# Patient Record
Sex: FEMALE | Race: OTHER | Employment: UNEMPLOYED | URBAN - METROPOLITAN AREA
[De-identification: names, ages, dates, MRNs, and addresses within clinical notes are randomized per-mention and may not be internally consistent; named-entity substitution may affect disease eponyms.]

---

## 2018-01-09 PROBLEM — K76.82 HEPATIC ENCEPHALOPATHY: Status: ACTIVE | Noted: 2018-01-09

## 2018-01-09 PROBLEM — G89.29 CHRONIC ABDOMINAL PAIN: Status: ACTIVE | Noted: 2018-01-09

## 2018-01-09 PROBLEM — R10.9 CHRONIC ABDOMINAL PAIN: Status: ACTIVE | Noted: 2018-01-09

## 2018-09-11 ENCOUNTER — OFFICE VISIT (OUTPATIENT)
Dept: FAMILY MEDICINE CLINIC | Age: 58
End: 2018-09-11

## 2018-09-11 VITALS
SYSTOLIC BLOOD PRESSURE: 114 MMHG | RESPIRATION RATE: 17 BRPM | OXYGEN SATURATION: 98 % | HEIGHT: 60 IN | TEMPERATURE: 98.6 F | HEART RATE: 104 BPM | BODY MASS INDEX: 27.88 KG/M2 | DIASTOLIC BLOOD PRESSURE: 64 MMHG | WEIGHT: 142 LBS

## 2018-09-11 DIAGNOSIS — G89.4 CHRONIC PAIN SYNDROME: ICD-10-CM

## 2018-09-11 DIAGNOSIS — K74.60 CIRRHOSIS OF LIVER WITH ASCITES, UNSPECIFIED HEPATIC CIRRHOSIS TYPE (HCC): Primary | ICD-10-CM

## 2018-09-11 DIAGNOSIS — R18.8 CIRRHOSIS OF LIVER WITH ASCITES, UNSPECIFIED HEPATIC CIRRHOSIS TYPE (HCC): Primary | ICD-10-CM

## 2018-09-11 DIAGNOSIS — K74.5 BILIARY CIRRHOSIS (HCC): ICD-10-CM

## 2018-09-11 RX ORDER — SPIRONOLACTONE 50 MG/1
100 TABLET, FILM COATED ORAL DAILY
Status: ON HOLD | COMMUNITY
End: 2019-04-09 | Stop reason: SDUPTHER

## 2018-09-11 RX ORDER — HYDROCODONE BITARTRATE AND ACETAMINOPHEN 10; 325 MG/1; MG/1
1 TABLET ORAL 2 TIMES DAILY
Qty: 30 TAB | Refills: 0 | Status: SHIPPED | OUTPATIENT
Start: 2018-09-11 | End: 2018-09-26

## 2018-09-11 RX ORDER — FUROSEMIDE 20 MG/1
20 TABLET ORAL DAILY
Status: ON HOLD | COMMUNITY
End: 2019-04-08 | Stop reason: SDUPTHER

## 2018-09-11 NOTE — MR AVS SNAPSHOT
[address] [address] 
[phone] Patient: Gricelda Kirkland MRN: [id number] TWE:[id number] Visit Information Date & Time Provider Department Dept. Phone Encounter #  
 9/11/2018  2:30 PM Sean Gomez MD [facility] [phone] [id number] Upcoming Health Maintenance Date Due Hepatitis C Screening 1960 Pneumococcal 19-64 Medium Risk (1 of 1 - PPSV23) 7/4/1981 DTaP/Tdap/Td series (1 - Tdap) 7/4/1983 PAP AKA CERVICAL CYTOLOGY 7/4/1983 BREAST CANCER SCRN MAMMOGRAM 7/4/2012 FOBT Q 1 YEAR AGE 50-75 7/4/2012 Influenza Age 5 to Adult 8/1/2018 Allergies as of 9/11/2018  Review Complete On: 8/5/2018 By: Nadia Wells RN Severity Noted Reaction Type Reactions Hydromorphone High 02/07/2017    Anaphylaxis Ativan [Lorazepam]  02/07/2017    Other (comments) Betamethasone  02/07/2017    Other (comments) Chlorzoxazone  02/07/2017    Other (comments) Codeine  02/07/2017    Other (comments) Hallucinations Diphenhydramine  02/07/2017    Hives, Rash Erythromycin  02/07/2017    Other (comments) Ibuprofen  02/07/2017    Other (comments) Gi bleeding Iodinated Contrast- Oral And Iv Dye  02/07/2017    Shortness of Breath Ketorolac Tromethamine  02/07/2017    Rash, Swelling Nubain [Nalbuphine]  02/07/2017    Other (comments) Sumatriptan  02/07/2017    Nausea and Vomiting Tramadol  02/07/2017    Other (comments) Tremors; skin irritation Current Immunizations  Never Reviewed No immunizations on file. Not reviewed this visit You Were Diagnosed With   
  
 Codes Comments Cirrhosis of liver with ascites, unspecified hepatic cirrhosis type ([facility] [address]    -  Primary ICD-10-CM: K74.60 ICD-9-CM: 571.5 Biliary cirrhosis (HCC)     ICD-10-CM: L18.1 ICD-9-CM: 571.6 Chronic pain syndrome     ICD-10-CM: G89.4 ICD-9-CM: 338. 4 Vitals BP Pulse Temp Resp Height(growth percentile) Weight(growth percentile) 114/64 (!) 104 98.6 °F (37 °C) (Oral) 17 5' (1.524 m) 142 lb (64.4 kg) SpO2 BMI OB Status Smoking Status 98% 27.73 kg/m2 Hysterectomy Former Smoker Vitals History BMI and BSA Data Body Mass Index Body Surface Area  
 27.73 kg/m 2 1.65 m 2 Preferred Pharmacy Pharmacy Name Phone [facility], [address] [place] [address] [phone] Your Updated Medication List  
  
   
This list is accurate as of 9/11/18  3:32 PM.  Always use your most recent med list.  
  
  
  
  
 HYDROcodone-acetaminophen  mg tablet Commonly known as:  Tiffanie Serrato Take 1 Tab by mouth two (2) times a day. Max Daily Amount: 2 Tabs. As needed  
  
 lactulose 10 gram/15 mL solution Commonly known as:  [place] Take 30 g by mouth three (3) times daily. LASIX 20 mg tablet Generic drug:  furosemide Take  by mouth daily. methocarbamol 500 mg tablet Commonly known as:  ROBAXIN Take 1 Tab by mouth four (4) times daily. omeprazole 20 mg capsule Commonly known as:  PRILOSEC Take 20 mg by mouth daily. ondansetron hcl 4 mg tablet Commonly known as:  [place]urlean Precise Take 4 mg by mouth every six (6) hours as needed for Nausea. potassium chloride SR 20 mEq tablet Commonly known as:  K-TAB Take 1 Tab by mouth two (2) times a day. promethazine 25 mg tablet Commonly known as:  PHENERGAN Take 1 Tab by mouth every six (6) hours as needed. spironolactone 50 mg tablet Commonly known as:  ALDACTONE Take  by mouth daily. XIFAXAN 550 mg tablet Generic drug:  rifAXIMin Take 550 mg by mouth two (2) times a day. Prescriptions Printed Refills  HYDROcodone-acetaminophen (NORCO)  mg tablet 0  
 Sig: Take 1 Tab by mouth two (2) times a day. Max Daily Amount: 2 Tabs. As needed Class: Print Route: Oral  
  
We Performed the Following REFERRAL TO PAIN MANAGEMENT [[id number] Custom] Comments:  
 Chronic pain with biliary cirrhosis ,for evaluation and management Referral Information Referral ID Referred By Referred To  
  
 [id number] Keysha COHN Not Available Visits Status Start Date End Date 1 New Request 9/11/18 9/11/19 If your referral has a status of pending review or denied, additional information will be sent to support the outcome of this decision. Introducing [facility]! Abbi Edwards introduces [employer] patient portal. Now you can access parts of your medical record, email your doctor's office, and request medication refills online. 1. In your internet browser, go to [email or web address]. [email or web address] 2. Click on the First Time User? Click Here link in the Sign In box. You will see the New Member Sign Up page. 3. Enter your [employer] Access Code exactly as it appears below. You will not need to use this code after youve completed the sign-up process. If you do not sign up before the expiration date, you must request a new code. · [employer] Access Code: [email or web address] Expires: 12/10/2018  3:05 PM 
 
4. Enter the last four digits of your Social Security Number (xxxx) and Date of Birth (mm/dd/yyyy) as indicated and click Submit. You will be taken to the next sign-up page. 5. Create a [employer] ID. This will be your [employer] login ID and cannot be changed, so think of one that is secure and easy to remember. 6. Create a [employer] password. You can change your password at any time. 7. Enter your Password Reset Question and Answer. This can be used at a later time if you forget your password. 8. Enter your e-mail address. You will receive e-mail notification when new information is available in [address] 9. Click Sign Up. You can now view and download portions of your medical record. 10. Click the Download Summary menu link to download a portable copy of your medical information. If you have questions, please visit the Frequently Asked Questions section of the [employer] website. Remember, [employer] is NOT to be used for urgent needs. For medical emergencies, dial 911. Now available from your iPhone and Android! Please provide this summary of care documentation to your next provider. Your primary care clinician is listed as Annalise Luna. If you have any questions after today's visit, please call [phone].

## 2018-09-11 NOTE — PROGRESS NOTES
Andrew Frances is a 66 y.o. female presents to office to establish care. Right upper quad pain Medication list has been reviewed with Andrew Frances and updated as of today's date Patient has been asked if refills needed as of today's date in which they replied;  no 
 
Hearing/Vision: No exam data present Learning Assessment:  
 
Learning Assessment 9/11/2018 PRIMARY LEARNER Patient PRIMARY LANGUAGE ENGLISH  
LEARNER PREFERENCE PRIMARY PICTURES  
  LISTENING  
  DEMONSTRATION  
  VIDEOS  
ANSWERED BY patient RELATIONSHIP SELF Depression Screening: No flowsheet data found. Fall Risk Assessment:  
 
Fall Risk Assessment, last 12 mths 9/11/2018 Able to walk? Yes Fall in past 12 months? No  
 
 
Abuse Screening:  
 
Abuse Screening Questionnaire 9/11/2018 Do you ever feel afraid of your partner? Frank Cooper Are you in a relationship with someone who physically or mentally threatens you? Frank Cooper Is it safe for you to go home?  Navi Rodney

## 2018-09-12 NOTE — PROGRESS NOTES
[facility] Chief Complaint Patient presents with Mel Carcamo Establish Care Vitals:  
 09/11/18 1441 BP: 114/64 Pulse: (!) 104 Resp: 17 Temp: 98.6 °F (37 °C) TempSrc: Oral  
SpO2: 98% Weight: 142 lb (64.4 kg) Height: 5' (1.524 m) PainSc:   9  
 
 
 
HPI: 66years old female accompanied by her daughter today, she is here to establish her care, and looking into changing her PCP they were not satisfied with her previous doctor! Patient has been diagnosed 2 years ago was biliary cirrhosis, and she had liver cirrhosis and liver failure, she is following up with the [facility] and she is in the process of being evaluated for liver transplant. Patient had fatigue she is tired and nauseous, and she is having right upper quadrant pain, she was on morphine sulfate from her previous provider and now she had tapered off and she is not on morphine anymore, she is looking into getting to pain management so she can be in a long-term pain control medication, she meanwhile she has been taking Norco as needed for pain. She needs refill for her [place] for today and a referral for pain management. Past Medical History:  
Diagnosis Date  Arthropathy, unspecified, site unspecified  Cirrhosis ([address]  Esophageal reflux  Hemochromatosis  Hypertension  Liver failure ([address]  Lower back pain  Migraine  Pancreatitis  Sinusitis  Uterine cancer ([address] Past Surgical History:  
Procedure Laterality Date  HX HERNIA REPAIR    
 HX LAP CHOLECYSTECTOMY    
 3/2007  HX TOTAL VAGINAL HYSTERECTOMY Social History Substance Use Topics  Smoking status: Former Smoker Packs/day: 0.00 Quit date: 11/11/2017  Smokeless tobacco: Never Used  Alcohol use No  
 
 
Family History Problem Relation Age of Onset  Hypertension Father Review of Systems Constitutional: Positive for malaise/fatigue.  Negative for chills, fever and weight loss. HENT: Negative for congestion, ear discharge, ear pain, hearing loss, nosebleeds, sinus pain and sore throat. Eyes: Negative for blurred vision, double vision and discharge. Respiratory: Negative for cough, hemoptysis, sputum production, shortness of breath and wheezing. Cardiovascular: Positive for palpitations. Negative for chest pain, claudication and leg swelling. Gastrointestinal: Positive for abdominal pain and nausea. Negative for blood in stool, constipation, diarrhea, melena and vomiting. Genitourinary: Negative for dysuria, frequency and urgency. Musculoskeletal: Negative for back pain, joint pain, myalgias and neck pain. Skin: Negative for itching and rash. Neurological: Positive for dizziness. Negative for tingling, sensory change, speech change, focal weakness, weakness and headaches. Physical Exam  
Constitutional: She is oriented to person, place, and time. She appears well-developed and well-nourished. No distress. HENT:  
Head: Normocephalic and atraumatic. Mouth/Throat: Oropharynx is clear and moist. No oropharyngeal exudate. Eyes: Pupils are equal, round, and reactive to light. Right eye exhibits no discharge. Left eye exhibits no discharge. No scleral icterus. Patient has jaundice Neck: Normal range of motion. Neck supple. No thyromegaly present. Cardiovascular: Normal rate, regular rhythm and normal heart sounds. No murmur heard. Pulmonary/Chest: Effort normal and breath sounds normal. No respiratory distress. She has no wheezes. She has no rales. She exhibits no tenderness. Abdominal: Soft. Bowel sounds are normal. She exhibits distension. There is no tenderness. There is no rebound and no guarding. Patient has abdominal distention shifting dullness is positive for ascites Musculoskeletal: She exhibits no edema or deformity. Lymphadenopathy:  
  She has no cervical adenopathy. Neurological: She is alert and oriented to person, place, and time. No cranial nerve deficit. Skin: Skin is warm and dry. No rash noted. She is not diaphoretic. No erythema. No pallor. Yellow discoloration of the skin Psychiatric: She has a normal mood and affect. Her behavior is normal. Judgment and thought content normal.  
  
 
Assessment and plan 1. Cirrhosis of liver with ascites, unspecified hepatic cirrhosis type ([facility] Patient is in the process of getting evaluated for liver transplant 
- REFERRAL TO PAIN MANAGEMENT 
- HYDROcodone-acetaminophen (NORCO)  mg tablet; Take 1 Tab by mouth two (2) times a day. Max Daily Amount: 2 Tabs. As needed  Dispense: 30 Tab; Refill: 0 
 
2. Biliary cirrhosis (HCC) 
 
- REFERRAL TO PAIN MANAGEMENT 
- HYDROcodone-acetaminophen (NORCO)  mg tablet; Take 1 Tab by mouth two (2) times a day. Max Daily Amount: 2 Tabs. As needed  Dispense: 30 Tab; Refill: 0 
 
3. Chronic pain syndrome I have discussed with the patient and daughter at length that chronic pain syndrome can lead to narcotic abuse she have to take the medication only as needed, and there is a high risk for drug overdose the daughter have access to Narcan. They both agreed to the plan and they have verbalized understanding 
- REFERRAL TO PAIN MANAGEMENT 
- HYDROcodone-acetaminophen (NORCO)  mg tablet; Take 1 Tab by mouth two (2) times a day. Max Daily Amount: 2 Tabs. As needed  Dispense: 30 Tab; Refill: 0 Current Outpatient Prescriptions Medication Sig Dispense Refill  furosemide (LASIX) 20 mg tablet Take  by mouth daily.  spironolactone (ALDACTONE) 50 mg tablet Take  by mouth daily.  HYDROcodone-acetaminophen (NORCO)  mg tablet Take 1 Tab by mouth two (2) times a day. Max Daily Amount: 2 Tabs. As needed 30 Tab 0  
 methocarbamol (ROBAXIN) 500 mg tablet Take 1 Tab by mouth four (4) times daily.  12 Tab 0  
  promethazine (PHENERGAN) 25 mg tablet Take 1 Tab by mouth every six (6) hours as needed. 12 Tab 0  
 potassium chloride SR (K-TAB) 20 mEq tablet Take 1 Tab by mouth two (2) times a day. 14 Tab 0  
 lactulose (CHRONULAC) 10 gram/15 mL solution Take 30 g by mouth three (3) times daily.  omeprazole (PRILOSEC) 20 mg capsule Take 20 mg by mouth daily.  ondansetron hcl (ZOFRAN) 4 mg tablet Take 4 mg by mouth every six (6) hours as needed for Nausea.  rifAXIMin (XIFAXAN) 550 mg tablet Take 550 mg by mouth two (2) times a day. Patient Active Problem List  
 Diagnosis Date Noted  Biliary cirrhosis ([facility] 09/11/2018  Cirrhosis of liver with ascites ([facility] 09/11/2018  Chronic pain syndrome 09/11/2018  Hepatic encephalopathy ([facility] 01/09/2018  Chronic abdominal pain 01/09/2018 Results for orders placed or performed during the hospital encounter of 07/16/18 LACTIC ACID Result Value Ref Range Lactic Acid 2.4 (H) 0.4 - 2.0 mmol/L Admission on 07/16/2018, Discharged on 07/16/2018 Component Date Value Ref Range Status  Lactic Acid 07/16/2018 2.4* 0.4 - 2.0 mmol/L Final  
Admission on 07/15/2018, Discharged on 07/15/2018 Component Date Value Ref Range Status  WBC 07/15/2018 2.6* 4.0 - 11.0 1000/mm3 Final  
 RBC 07/15/2018 3.07* 3.60 - 5.20 M/uL Final  
 HGB 07/15/2018 9.4* 13.0 - 17.2 gm/dl Final  
 HCT 07/15/2018 27.7* 37.0 - 50.0 % Final  
 MCV 07/15/2018 90.2  80.0 - 98.0 fL Final  
 MCH 07/15/2018 30.6  25.4 - 34.6 pg Final  
 MCHC 07/15/2018 33.9  30.0 - 36.0 gm/dl Final  
 PLATELET 39/78/3078 70* 140 - 450 1000/mm3 Final  
 RDW-SD 07/15/2018 65.5* 36.4 - 46.3   Final  
 NEUTROPHILS 07/15/2018 67.2* 34 - 64 % Final  
 LYMPHOCYTES 07/15/2018 19.5* 28 - 48 % Final  
 MONOCYTES 07/15/2018 9.4  1 - 13 % Final  
 EOSINOPHILS 07/15/2018 3.1  0 - 5 % Final  
 BASOPHILS 07/15/2018 0.4  0 - 3 % Final  
 Sodium 07/15/2018 140  136 - 145 mEq/L Final  
  Potassium 07/15/2018 4.0  3.5 - 5.1 mEq/L Final  
 Chloride 07/15/2018 106  98 - 107 mEq/L Final  
 CO2 07/15/2018 27  21 - 32 mEq/L Final  
 Glucose 07/15/2018 115* 74 - 106 mg/dl Final  
 BUN 07/15/2018 10  7 - 25 mg/dl Final  
 Creatinine 07/15/2018 0.8  0.6 - 1.3 mg/dl Final  
 GFR est AA 07/15/2018 >60.0    Final  
 Comment: THE [employer] WORKING GROUP STATES THAT THE MDRD STUDY EQUATION SHOULD ONLY BE USED ON 
INDIVIDUALS 18 OR OLDER. THE REPORT ALSO NOTES THAT THE MDRD STUDY EQUATION HAS NOT BEEN 
VALIDATED FOR USE WITH THE ELDERLY (OVER 79YEARS OF AGE), PREGNANT WOMEN, PATIENTS WITH SERIOUS 
COMORBID CONDITIONS, OR PERSONS WITH EXTREMES OF BODY SIZE, MUSCLE MASS, OR NUTRITIONAL STATUS. APPLICATION OF THE EQUATION TO THESE PATIENT GROUPS MAY LEAD TO ERRORS IN GFR ESTIMATION. GFR 
ESTIMATING EQUATIONS HAVE POORER AGREEMENT WITH MEASURED GFR FOR ILL HOSPITALIZED PATIENTS AND FOR PEOPLE WITH NEAR NORMAL KIDNEY FUNCTION THAN FOR SUBJECTS IN THE MDRD STUDY. VALIDATION 
STUDIES ARE IN PROGRESS TO EVALUATE THE MDRD STUDY EQUATION FOR ADDITIONAL ETHNIC GROUPS, THE 
ELDERLY, VARIOUS DISEASE CONDITIONS, AND PEOPLE WITH NORMAL KIDNEY FUNCTION. GFRA----REFERS TO  
GFRO---REFERS TO OTHER RACES 
REFERENCES AVAILABLE UPON REQUEST. 
  
 GFR est non-AA 07/15/2018 >60    Final  
 Calcium 07/15/2018 8.2* 8.5 - 10.1 mg/dl Final  
 AST (SGOT) 07/15/2018 51* 15 - 37 U/L Final  
 ALT (SGPT) 07/15/2018 22  12 - 78 U/L Final  
 Alk.  phosphatase 07/15/2018 237* 45 - 117 U/L Final  
 Bilirubin, total 07/15/2018 4.0* 0.2 - 1.0 mg/dl Final  
 Protein, total 07/15/2018 6.2* 6.4 - 8.2 gm/dl Final  
 Albumin 07/15/2018 2.3* 3.4 - 5.0 gm/dl Final  
 Anion gap 07/15/2018 7  5 - 15 mmol/L Final  
 Lipase 07/15/2018 324  73 - 393 U/L Final  
 Magnesium 07/15/2018 1.7  1.6 - 2.6 mg/dl Final  
 Lactic Acid 07/15/2018 2.4* 0.4 - 2.0 mmol/L Final  
  Glucose 07/15/2018 Negative  NEGATIVE,Negative mg/dl Final  
 Bilirubin 07/15/2018 Negative  NEGATIVE,Negative   Final  
 Ketone 07/15/2018 Negative  NEGATIVE,Negative mg/dl Final  
 Specific gravity 07/15/2018 1.015  1.005 - 1.030   Final  
 Blood 07/15/2018 Small* NEGATIVE,Negative   Final  
 pH (UA) 07/15/2018 6.5  5 - 9   Final  
 Protein 07/15/2018 Negative  NEGATIVE,Negative mg/dl Final  
 Urobilinogen 07/15/2018 0.2  0.0 - 1.0 EU/dl Final  
 Nitrites 07/15/2018 Negative  NEGATIVE,Negative   Final  
 Leukocyte Esterase 07/15/2018 Negative  NEGATIVE,Negative   Final  
 Color 07/15/2018 Other    Final  
 Appearance 07/15/2018 Other    Final  
 : [id number] Admission on 07/07/2018, Discharged on 07/08/2018 Component Date Value Ref Range Status  WBC 07/07/2018 3.1* 4.0 - 11.0 1000/mm3 Final  
 RBC 07/07/2018 3.18* 3.60 - 5.20 M/uL Final  
 HGB 07/07/2018 9.7* 13.0 - 17.2 gm/dl Final  
 HCT 07/07/2018 29.2* 37.0 - 50.0 % Final  
 MCV 07/07/2018 91.8  80.0 - 98.0 fL Final  
 MCH 07/07/2018 30.5  25.4 - 34.6 pg Final  
 MCHC 07/07/2018 33.2  30.0 - 36.0 gm/dl Final  
 PLATELET 04/10/1373 60* 140 - 450 1000/mm3 Final  
 MPV 07/07/2018 11.8* 6.0 - 10.0 fL Final  
 RDW-SD 07/07/2018 65.1* 36.4 - 46.3   Final  
 NRBC 07/07/2018 0  0 - 0   Final  
 IMMATURE GRANULOCYTES 07/07/2018 0.3  0.0 - 3.0 % Final  
 Comment: IG - Immature granulocytes (promyelocytes + myelocytes + metamyelocytes), their presence 
indicates a left shift. An IG > 3% may predict positive blood cultures with 98% specificity. 
(P<0.04) and 92% Positive Predictive Value (Dahlia-Aicha)1. Increased immature granulocytes 
assist with the detection of infection and/or inflammation and may be present at an early stage 
and are more sensitive and specific than band counts.  
  
 NEUTROPHILS 07/07/2018 65.0* 34 - 64 % Final  
 LYMPHOCYTES 07/07/2018 21.5* 28 - 48 % Final  
 MONOCYTES 07/07/2018 9.0  1 - 13 % Final  
  EOSINOPHILS 07/07/2018 3.2  0 - 5 % Final  
 BASOPHILS 07/07/2018 1.0  0 - 3 % Final  
 Sodium 07/07/2018 141  136 - 145 mEq/L Final  
 Potassium 07/07/2018 3.5  3.5 - 5.1 mEq/L Final  
 Chloride 07/07/2018 107  98 - 107 mEq/L Final  
 CO2 07/07/2018 24  21 - 32 mEq/L Final  
 Glucose 07/07/2018 119* 74 - 106 mg/dl Final  
 BUN 07/07/2018 12  7 - 25 mg/dl Final  
 Creatinine 07/07/2018 0.9  0.6 - 1.3 mg/dl Final  
 GFR est AA 07/07/2018 >60.0    Final  
 Comment: THE [employer] WORKING GROUP STATES THAT THE MDRD STUDY EQUATION SHOULD ONLY BE USED ON 
INDIVIDUALS 18 OR OLDER. THE REPORT ALSO NOTES THAT THE MDRD STUDY EQUATION HAS NOT BEEN 
VALIDATED FOR USE WITH THE ELDERLY (OVER 79YEARS OF AGE), PREGNANT WOMEN, PATIENTS WITH SERIOUS 
COMORBID CONDITIONS, OR PERSONS WITH EXTREMES OF BODY SIZE, MUSCLE MASS, OR NUTRITIONAL STATUS. APPLICATION OF THE EQUATION TO THESE PATIENT GROUPS MAY LEAD TO ERRORS IN GFR ESTIMATION. GFR 
ESTIMATING EQUATIONS HAVE POORER AGREEMENT WITH MEASURED GFR FOR ILL HOSPITALIZED PATIENTS AND FOR PEOPLE WITH NEAR NORMAL KIDNEY FUNCTION THAN FOR SUBJECTS IN THE MDRD STUDY. VALIDATION 
STUDIES ARE IN PROGRESS TO EVALUATE THE MDRD STUDY EQUATION FOR ADDITIONAL ETHNIC GROUPS, THE 
ELDERLY, VARIOUS DISEASE CONDITIONS, AND PEOPLE WITH NORMAL KIDNEY FUNCTION. GFRA----REFERS TO  
GFRO---REFERS TO OTHER RACES 
REFERENCES AVAILABLE UPON REQUEST. 
  
 GFR est non-AA 07/07/2018 >60    Final  
 Calcium 07/07/2018 8.4* 8.5 - 10.1 mg/dl Final  
 AST (SGOT) 07/07/2018 40* 15 - 37 U/L Final  
 ALT (SGPT) 07/07/2018 22  12 - 78 U/L Final  
 Alk.  phosphatase 07/07/2018 254* 45 - 117 U/L Final  
 Bilirubin, total 07/07/2018 2.8* 0.2 - 1.0 mg/dl Final  
 Protein, total 07/07/2018 6.6  6.4 - 8.2 gm/dl Final  
 Albumin 07/07/2018 2.5* 3.4 - 5.0 gm/dl Final  
 Anion gap 07/07/2018 10  5 - 15 mmol/L Final  
 Lipase 07/07/2018 713* 73 - 393 U/L Final  
 Admission on 07/03/2018, Discharged on 07/03/2018 Component Date Value Ref Range Status  WBC 07/03/2018 3.7* 4.0 - 11.0 1000/mm3 Final  
 RBC 07/03/2018 3.32* 3.60 - 5.20 M/uL Final  
 HGB 07/03/2018 10.3* 13.0 - 17.2 gm/dl Final  
 HCT 07/03/2018 29.8* 37.0 - 50.0 % Final  
 MCV 07/03/2018 89.8  80.0 - 98.0 fL Final  
 MCH 07/03/2018 31.0  25.4 - 34.6 pg Final  
 MCHC 07/03/2018 34.6  30.0 - 36.0 gm/dl Final  
 PLATELET 96/39/5942 56* 140 - 450 1000/mm3 Final  
 MPV 07/03/2018 12.2* 6.0 - 10.0 fL Final  
 RDW-SD 07/03/2018 62.5* 36.4 - 46.3   Final  
 NRBC 07/03/2018 0  0 - 0   Final  
 IMMATURE GRANULOCYTES 07/03/2018 0.3  0.0 - 3.0 % Final  
 Comment: IG - Immature granulocytes (promyelocytes + myelocytes + metamyelocytes), their presence 
indicates a left shift. An IG > 3% may predict positive blood cultures with 98% specificity. 
(P<0.04) and 92% Positive Predictive Value (Judith)1. Increased immature granulocytes 
assist with the detection of infection and/or inflammation and may be present at an early stage 
and are more sensitive and specific than band counts.  NEUTROPHILS 07/03/2018 64.4* 34 - 64 % Final  
 LYMPHOCYTES 07/03/2018 21.0* 28 - 48 % Final  
 MONOCYTES 07/03/2018 8.9  1 - 13 % Final  
 EOSINOPHILS 07/03/2018 4.3  0 - 5 % Final  
 BASOPHILS 07/03/2018 1.1  0 - 3 % Final  
 Sodium 07/03/2018 138  136 - 145 mEq/L Final  
 Potassium 07/03/2018 3.2* 3.5 - 5.1 mEq/L Final  
 Chloride 07/03/2018 106  98 - 107 mEq/L Final  
 CO2 07/03/2018 25  21 - 32 mEq/L Final  
 Glucose 07/03/2018 107* 74 - 106 mg/dl Final  
 BUN 07/03/2018 8  7 - 25 mg/dl Final  
 Creatinine 07/03/2018 0.9  0.6 - 1.3 mg/dl Final  
 GFR est AA 07/03/2018 >60.0    Final  
 Comment: THE [facility] WORKING GROUP STATES THAT THE MDRD STUDY EQUATION SHOULD ONLY BE USED ON 
INDIVIDUALS 18 OR OLDER.  THE REPORT ALSO NOTES THAT THE MDRD STUDY EQUATION HAS NOT BEEN 
 VALIDATED FOR USE WITH THE ELDERLY (OVER 79YEARS OF AGE), PREGNANT WOMEN, PATIENTS WITH SERIOUS 
COMORBID CONDITIONS, OR PERSONS WITH EXTREMES OF BODY SIZE, MUSCLE MASS, OR NUTRITIONAL STATUS. APPLICATION OF THE EQUATION TO THESE PATIENT GROUPS MAY LEAD TO ERRORS IN GFR ESTIMATION. GFR 
ESTIMATING EQUATIONS HAVE POORER AGREEMENT WITH MEASURED GFR FOR ILL HOSPITALIZED PATIENTS AND FOR PEOPLE WITH NEAR NORMAL KIDNEY FUNCTION THAN FOR SUBJECTS IN THE MDRD STUDY. VALIDATION 
STUDIES ARE IN PROGRESS TO EVALUATE THE MDRD STUDY EQUATION FOR ADDITIONAL ETHNIC GROUPS, THE 
ELDERLY, VARIOUS DISEASE CONDITIONS, AND PEOPLE WITH NORMAL KIDNEY FUNCTION. GFRA----REFERS TO  
GFRO---REFERS TO OTHER RACES 
REFERENCES AVAILABLE UPON REQUEST. 
  
 GFR est non-AA 07/03/2018 >60    Final  
 Calcium 07/03/2018 8.0* 8.5 - 10.1 mg/dl Final  
 AST (SGOT) 07/03/2018 42* 15 - 37 U/L Final  
 ALT (SGPT) 07/03/2018 25  12 - 78 U/L Final  
 Alk. phosphatase 07/03/2018 288* 45 - 117 U/L Final  
 Bilirubin, total 07/03/2018 3.2* 0.2 - 1.0 mg/dl Final  
 Protein, total 07/03/2018 7.0  6.4 - 8.2 gm/dl Final  
 Albumin 07/03/2018 2.6* 3.4 - 5.0 gm/dl Final  
 Anion gap 07/03/2018 8  5 - 15 mmol/L Final  
 Lipase 07/03/2018 422* 73 - 393 U/L Final  
Admission on 06/21/2018, Discharged on 06/21/2018 Component Date Value Ref Range Status  WBC 06/21/2018 3.8* 4.0 - 11.0 1000/mm3 Final  
 RBC 06/21/2018 3.37* 3.60 - 5.20 M/uL Final  
 HGB 06/21/2018 10.5* 13.0 - 17.2 gm/dl Final  
 HCT 06/21/2018 29.9* 37.0 - 50.0 % Final  
 MCV 06/21/2018 88.7  80.0 - 98.0 fL Final  
 MCH 06/21/2018 31.2  25.4 - 34.6 pg Final  
 MCHC 06/21/2018 35.1  30.0 - 36.0 gm/dl Final  
 PLATELET 14/37/5024 45* 140 - 450 1000/mm3 Final  
 Comment: THE FOLLOWING TEST HAS BEEN DETERMINED TO HAVE A CRITICAL VALUE. THE  PLT CT  RESULT WAS CALLED TO (AND READ BACK BY)  BRITTANY.             
THE NOTIFICATION WAS MADE ON 6/21/18 BY Juanjo Estevez 
  
  MPV 06/21/2018 11.7* 6.0 - 10.0 fL Final  
 RDW-SD 06/21/2018 60.0* 36.4 - 46.3   Final  
 NRBC 06/21/2018 0  0 - 0   Final  
 IMMATURE GRANULOCYTES 06/21/2018 0.3  0.0 - 3.0 % Final  
 Comment: IG - Immature granulocytes (promyelocytes + myelocytes + metamyelocytes), their presence 
indicates a left shift. An IG > 3% may predict positive blood cultures with 98% specificity. 
(P<0.04) and 92% Positive Predictive Value (Dahlia-Aicha)1. Increased immature granulocytes 
assist with the detection of infection and/or inflammation and may be present at an early stage 
and are more sensitive and specific than band counts.  NEUTROPHILS 06/21/2018 70.0* 34 - 64 % Final  
 LYMPHOCYTES 06/21/2018 16.7* 28 - 48 % Final  
 MONOCYTES 06/21/2018 9.3  1 - 13 % Final  
 EOSINOPHILS 06/21/2018 2.9  0 - 5 % Final  
 BASOPHILS 06/21/2018 0.8  0 - 3 % Final  
 Lipase 06/21/2018 414* 73 - 393 U/L Final  
 Potassium 06/21/2018 3.4* 3.5 - 5.1 mEq/L Final  
 Chloride 06/21/2018 104  98 - 107 mEq/L Final  
 Sodium 06/21/2018 138  136 - 145 mEq/L Final  
 CO2 06/21/2018 22  21 - 32 mEq/L Final  
 Glucose 06/21/2018 190* 60 - 100 mg/dl Final  
 BUN 06/21/2018 11  7 - 25 mg/dl Final  
 Creatinine 06/21/2018 1.0  0.6 - 1.3 mg/dl Final  
 GFR est AA 06/21/2018 >60.0    Final  
 Comment: THE [facility] [employer] WORKING GROUP STATES THAT THE MDRD STUDY EQUATION SHOULD ONLY BE USED ON 
INDIVIDUALS 18 OR OLDER. THE REPORT ALSO NOTES THAT THE MDRD STUDY EQUATION HAS NOT BEEN 
VALIDATED FOR USE WITH THE ELDERLY (OVER 79YEARS OF AGE), PREGNANT WOMEN, PATIENTS WITH SERIOUS 
COMORBID CONDITIONS, OR PERSONS WITH EXTREMES OF BODY SIZE, MUSCLE MASS, OR NUTRITIONAL STATUS. APPLICATION OF THE EQUATION TO THESE PATIENT GROUPS MAY LEAD TO ERRORS IN GFR ESTIMATION. GFR 
ESTIMATING EQUATIONS HAVE POORER AGREEMENT WITH MEASURED GFR FOR ILL HOSPITALIZED PATIENTS AND FOR PEOPLE WITH NEAR NORMAL KIDNEY FUNCTION THAN FOR SUBJECTS IN THE MDRD STUDY. VALIDATION 
STUDIES ARE IN PROGRESS TO EVALUATE THE MDRD STUDY EQUATION FOR ADDITIONAL ETHNIC GROUPS, THE 
ELDERLY, VARIOUS DISEASE CONDITIONS, AND PEOPLE WITH NORMAL KIDNEY FUNCTION. GFRA----REFERS TO  
GFRO---REFERS TO OTHER RACES 
REFERENCES AVAILABLE UPON REQUEST. 
  
 GFR est non-AA 06/21/2018 >60    Final  
 Calcium 06/21/2018 8.7  8.5 - 10.1 mg/dl Final  
 AST (SGOT) 06/21/2018 42* 15 - 37 U/L Final  
 ALT (SGPT) 06/21/2018 22  12 - 78 U/L Final  
 Alk. phosphatase 06/21/2018 288* 45 - 117 U/L Final  
 Bilirubin, total 06/21/2018 3.6* 0.2 - 1.0 mg/dl Final  
 Protein, total 06/21/2018 6.7  6.4 - 8.2 gm/dl Final  
 Albumin 06/21/2018 2.5* 3.4 - 5.0 gm/dl Final  
 Anion gap 06/21/2018 12  5 - 15 mmol/L Final  
 Glucose 06/21/2018 Negative  NEGATIVE,Negative mg/dl Final  
 Bilirubin 06/21/2018 Negative  NEGATIVE,Negative   Final  
 Ketone 06/21/2018 Negative  NEGATIVE,Negative mg/dl Final  
 Specific gravity 06/21/2018 1.010  1.005 - 1.030   Final  
 Blood 06/21/2018 Moderate* NEGATIVE,Negative   Final  
 pH (UA) 06/21/2018 5.0  5 - 9   Final  
 Protein 06/21/2018 Negative  NEGATIVE,Negative mg/dl Final  
 Urobilinogen 06/21/2018 0.2  0.0 - 1.0 EU/dl Final  
 Nitrites 06/21/2018 Negative  NEGATIVE,Negative   Final  
 Leukocyte Esterase 06/21/2018 Negative  NEGATIVE,Negative   Final  
 Color 06/21/2018 Yellow    Final  
 Appearance 06/21/2018 Clear    Final  
 : [id number] Follow-up Disposition: 
Return in about 1 month (around 10/11/2018).

## 2018-09-26 ENCOUNTER — OFFICE VISIT (OUTPATIENT)
Dept: FAMILY MEDICINE CLINIC | Age: 58
End: 2018-09-26

## 2018-09-26 VITALS
HEART RATE: 106 BPM | DIASTOLIC BLOOD PRESSURE: 63 MMHG | SYSTOLIC BLOOD PRESSURE: 136 MMHG | TEMPERATURE: 97.2 F | OXYGEN SATURATION: 95 % | WEIGHT: 138 LBS | HEIGHT: 60 IN | RESPIRATION RATE: 18 BRPM | BODY MASS INDEX: 27.09 KG/M2

## 2018-09-26 DIAGNOSIS — R18.8 CIRRHOSIS OF LIVER WITH ASCITES, UNSPECIFIED HEPATIC CIRRHOSIS TYPE (HCC): Primary | ICD-10-CM

## 2018-09-26 DIAGNOSIS — G89.29 CHRONIC ABDOMINAL PAIN: ICD-10-CM

## 2018-09-26 DIAGNOSIS — K74.60 CIRRHOSIS OF LIVER WITH ASCITES, UNSPECIFIED HEPATIC CIRRHOSIS TYPE (HCC): Primary | ICD-10-CM

## 2018-09-26 DIAGNOSIS — R11.2 NON-INTRACTABLE VOMITING WITH NAUSEA, UNSPECIFIED VOMITING TYPE: ICD-10-CM

## 2018-09-26 DIAGNOSIS — G89.4 CHRONIC PAIN SYNDROME: ICD-10-CM

## 2018-09-26 DIAGNOSIS — R10.9 CHRONIC ABDOMINAL PAIN: ICD-10-CM

## 2018-09-26 DIAGNOSIS — K74.5 BILIARY CIRRHOSIS (HCC): ICD-10-CM

## 2018-09-26 RX ORDER — PROMETHAZINE HYDROCHLORIDE 25 MG/1
25 TABLET ORAL
Qty: 30 TAB | Refills: 2 | Status: SHIPPED | OUTPATIENT
Start: 2018-09-26 | End: 2018-11-03 | Stop reason: SDUPTHER

## 2018-09-26 RX ORDER — OXYCODONE AND ACETAMINOPHEN 5; 325 MG/1; MG/1
1 TABLET ORAL
Qty: 30 TAB | Refills: 0 | Status: SHIPPED | OUTPATIENT
Start: 2018-09-26 | End: 2018-10-04 | Stop reason: SDUPTHER

## 2018-09-26 NOTE — PROGRESS NOTES
Kristopher Salas is a 66 y.o. female (: 1960) presenting to address: Chief Complaint Patient presents with  ED Follow-up Kim Chester on 18 for RUQ abdominal pain PL 9/10 Vitals:  
 18 1524 BP: 136/63 Pulse: (!) 106 Resp: 18 Temp: 97.2 °F (36.2 °C) TempSrc: Oral  
SpO2: 95% Weight: 138 lb (62.6 kg) Height: 5' (1.524 m) PainSc:   4 PainLoc: Rib Cage Hearing/Vision: No exam data present Learning Assessment:  
 
Learning Assessment 2018 PRIMARY LEARNER Patient PRIMARY LANGUAGE ENGLISH  
LEARNER PREFERENCE PRIMARY PICTURES  
  LISTENING  
  DEMONSTRATION  
  VIDEOS  
ANSWERED BY patient RELATIONSHIP SELF Depression Screening: PHQ over the last two weeks 2018 Little interest or pleasure in doing things Not at all Feeling down, depressed, irritable, or hopeless Not at all Total Score PHQ 2 0 Fall Risk Assessment:  
 
Fall Risk Assessment, last 12 mths 2018 Able to walk? Yes Fall in past 12 months? No  
 
Abuse Screening:  
 
Abuse Screening Questionnaire 2018 Do you ever feel afraid of your partner? Shaun Andre Are you in a relationship with someone who physically or mentally threatens you? [place] Andre Is it safe for you to go home? [facility] Questionaire: 1. Have you been to the ER, urgent care clinic since your last visit? Hospitalized since your last visit? YES, Neyda Peters on 18 for RUQ abdominal pain PL 9/10 
 
2. Have you seen or consulted any other health care providers outside of the [facility] since your last visit? Include any pap smears or colon screening.  NO

## 2018-09-26 NOTE — LETTER
2018 3:55 PM 
 
Ms. Torres [address] [id number] This is to inform [facility] for [employer] court that the patient's caregiver, Marcellus Moran (: 1984) is not able to perform jury duties from 2018 to October 15, 2018 to care for an immediate family member that needs care 24 hrs, 7 days week. Please call our office if you have any question(s) [phone] Sincerely, Manuel Dick MD

## 2018-09-26 NOTE — MR AVS SNAPSHOT
[address] [address] 
[phone] Patient: Miguel Carlos MRN: [id number] PED:3[birth year]494 Visit Information Date & Time Provider Department Dept. Phone Encounter #  
 9/26/2018  3:00 PM 57 Jf Laguna MD [address] [place] [address] [phone] [id number] Follow-up Instructions Return as needed. Upcoming Health Maintenance Date Due Hepatitis C Screening 1960 Pneumococcal 19-64 Medium Risk (1 of 1 - PPSV23) 7/4/1981 DTaP/Tdap/Td series (1 - Tdap) 7/4/1983 PAP AKA CERVICAL CYTOLOGY 7/4/1983 Shingrix Vaccine Age 50> (1 of 2) 7/4/2012 BREAST CANCER SCRN MAMMOGRAM 7/4/2012 FOBT Q 1 YEAR AGE 50-75 7/4/2012 Influenza Age 5 to Adult 8/1/2018 Allergies as of 9/26/2018  Review Complete On: 9/26/2018 By: Everett Baker LPN Severity Noted Reaction Type Reactions Hydromorphone High 02/07/2017    Anaphylaxis Ativan [Lorazepam]  02/07/2017    Other (comments) Betamethasone  02/07/2017    Other (comments) Chlorzoxazone  02/07/2017    Other (comments) Codeine  02/07/2017    Other (comments) Hallucinations Diphenhydramine  02/07/2017    Hives, Rash Erythromycin  02/07/2017    Other (comments) Ibuprofen  02/07/2017    Other (comments) Gi bleeding Iodinated Contrast- Oral And Iv Dye  02/07/2017    Shortness of Breath Ketorolac Tromethamine  02/07/2017    Rash, Swelling Nubain [Nalbuphine]  02/07/2017    Other (comments) Sumatriptan  02/07/2017    Nausea and Vomiting Tramadol  02/07/2017    Other (comments) Tremors; skin irritation Current Immunizations  Never Reviewed No immunizations on file. Not reviewed this visit You Were Diagnosed With   
  
 Codes Comments Cirrhosis of liver with ascites, unspecified hepatic cirrhosis type ([facility] [address]    -  Primary ICD-10-CM: K74.60 ICD-9-CM: 571.5 Biliary cirrhosis (HCC)     ICD-10-CM: Q91.0 ICD-9-CM: 571.6 Chronic pain syndrome     ICD-10-CM: G89.4 ICD-9-CM: 338. 4 Chronic abdominal pain     ICD-10-CM: R10.9, G89.29 ICD-9-CM: 789.00, 338.29 Non-intractable vomiting with nausea, unspecified vomiting type     ICD-10-CM: R11.2 ICD-9-CM: 787.01 Vitals BP Pulse Temp Resp Height(growth percentile) Weight(growth percentile) 136/63 (BP 1 Location: Left arm, BP Patient Position: Sitting) (!) 106 97.2 °F (36.2 °C) (Oral) 18 5' (1.524 m) 138 lb (62.6 kg) SpO2 BMI OB Status Smoking Status 95% 26.95 kg/m2 Hysterectomy Former Smoker Vitals History BMI and BSA Data Body Mass Index Body Surface Area  
 26.95 kg/m 2 1.63 m 2 Preferred Pharmacy Pharmacy Name Phone [facility] [phone] Your Updated Medication List  
  
   
This list is accurate as of 9/26/18  4:07 PM.  Always use your most recent med list.  
  
  
  
  
 lactulose 10 gram/15 mL solution Commonly known as:  Citlali Sita Take 30 g by mouth three (3) times daily. LASIX 20 mg tablet Generic drug:  furosemide Take  by mouth daily. methocarbamol 500 mg tablet Commonly known as:  ROBAXIN Take 1 Tab by mouth four (4) times daily. omeprazole 20 mg capsule Commonly known as:  PRILOSEC Take 20 mg by mouth daily. ondansetron hcl 4 mg tablet Commonly known as:  Reuel Ewings Take 4 mg by mouth every six (6) hours as needed for Nausea. oxyCODONE-acetaminophen 5-325 mg per tablet Commonly known as:  PERCOCET Take 1 Tab by mouth every six (6) hours as needed for Pain. Max Daily Amount: 4 Tabs. potassium chloride SR 20 mEq tablet Commonly known as:  K-TAB Take 1 Tab by mouth two (2) times a day. promethazine 25 mg tablet Commonly known as:  PHENERGAN Take 1 Tab by mouth every six (6) hours as needed. spironolactone 50 mg tablet Commonly known as:  ALDACTONE Take  by mouth daily. XIFAXAN 550 mg tablet Generic drug:  rifAXIMin Take 550 mg by mouth two (2) times a day. Prescriptions Printed Refills  
 oxyCODONE-acetaminophen (PERCOCET) 5-325 mg per tablet 0 Sig: Take 1 Tab by mouth every six (6) hours as needed for Pain. Max Daily Amount: 4 Tabs. Class: Print Route: Oral  
  
Prescriptions Sent to Pharmacy Refills  
 promethazine (PHENERGAN) 25 mg tablet 2 Sig: Take 1 Tab by mouth every six (6) hours as needed. Class: Normal  
 Pharmacy: [facility], [address] [place] [address], [address] [facility] #: [phone] Route: Oral  
  
Follow-up Instructions Return as needed. Introducing [facility]! [employer] introduces [employer] patient portal. Now you can access parts of your medical record, email your doctor's office, and request medication refills online. 1. In your internet browser, go to [email or web address] 2. Click on the First Time User? Click Here link in the Sign In box. You will see the New Member Sign Up page. 3. Enter your [facility] [employer] Access Code exactly as it appears below. You will not need to use this code after youve completed the sign-up process. If you do not sign up before the expiration date, you must request a new code. · [employer] Access Code: [email or web address] Expires: 12/10/2018  3:05 PM 
 
4. Enter the last four digits of your Social Security Number (xxxx) and Date of Birth (mm/dd/yyyy) as indicated and click Submit. You will be taken to the next sign-up page. 5. Create a Perfect [employer] ID. This will be your [facility] [employer] login ID and cannot be changed, so think of one that is secure and easy to remember. 6. Create a [facility] [employer] password. You can change your password at any time. 7. Enter your Password Reset Question and Answer. This can be used at a later time if you forget your password. 8. Enter your e-mail address. You will receive e-mail notification when new information is available in [address] 9. Click Sign Up. You can now view and download portions of your medical record. 10. Click the Download Summary menu link to download a portable copy of your medical information. If you have questions, please visit the Frequently Asked Questions section of the [employer] website. Remember, [employer] is NOT to be used for urgent needs. For medical emergencies, dial 911. Now available from your iPhone and Android! Please provide this summary of care documentation to your next provider. Your primary care clinician is listed as Zayra Castle. If you have any questions after today's visit, please call [phone].

## 2018-09-26 NOTE — PROGRESS NOTES
[facility] Chief Complaint Patient presents with  ED Follow-up Sophia Pitt on 09/24/18 for RUQ abdominal pain PL 9/10 Vitals:  
 09/26/18 1524 BP: 136/63 Pulse: (!) 106 Resp: 18 Temp: 97.2 °F (36.2 °C) TempSrc: Oral  
SpO2: 95% Weight: 138 lb (62.6 kg) Height: 5' (1.524 m) PainSc:   4 PainLoc: Rib Cage HPI: Patient is here for refill on pain medications she stopped taking Norco because causing her abdominal distention, and she received Percocet in the emergency room, patient has been to the ER twice because of abdominal pain and vomiting. She is a case of chronic cirrhosis ascites and episode hepatic encephalopathy. Her daughter accompanies her all the time and she is her caregiver 7 days a week 24 hours a day. She also needs refill on Phenergan that she takes as needed for nausea and vomiting. Currently the patient does not have any nausea or vomiting no severe abdominal pain is been variable with Percocet every 4-6 hours. Patient still waiting to get into pain management for long-term pain control. Past Medical History:  
Diagnosis Date  Arthropathy, unspecified, site unspecified  Cirrhosis ([address]  Esophageal reflux  Hemochromatosis  Hypertension  Liver failure ([address]  Lower back pain  Migraine  Pancreatitis  Sinusitis  Uterine cancer ([address] Past Surgical History:  
Procedure Laterality Date  HX HERNIA REPAIR    
 HX LAP CHOLECYSTECTOMY    
 3/2007  HX TOTAL VAGINAL HYSTERECTOMY Social History Substance Use Topics  Smoking status: Former Smoker Packs/day: 0.00 Quit date: 11/11/2017  Smokeless tobacco: Never Used  Alcohol use No  
 
 
Family History Problem Relation Age of Onset  Hypertension Father Review of Systems Constitutional: Negative for chills, fever, malaise/fatigue and weight loss. HENT: Negative for congestion, ear discharge, ear pain, hearing loss, nosebleeds and sinus pain. Eyes: Negative for blurred vision, double vision and discharge. Respiratory: Negative for cough, hemoptysis, sputum production, shortness of breath and wheezing. Cardiovascular: Negative for chest pain, palpitations, claudication and leg swelling. Gastrointestinal: Positive for abdominal pain, diarrhea, nausea and vomiting. Negative for blood in stool, constipation and melena. Genitourinary: Negative for dysuria, flank pain, frequency, hematuria and urgency. Musculoskeletal: Negative for back pain, joint pain, myalgias and neck pain. Skin: Negative for itching and rash. Neurological: Negative for dizziness, tingling, sensory change, speech change, focal weakness, seizures, weakness and headaches. Psychiatric/Behavioral: Negative for depression, hallucinations, substance abuse and suicidal ideas. The patient is not nervous/anxious. Physical Exam  
Constitutional: She is oriented to person, place, and time. She appears well-developed and well-nourished. No distress. HENT:  
Head: Normocephalic and atraumatic. Mouth/Throat: Oropharynx is clear and moist. No oropharyngeal exudate. Eyes: Conjunctivae are normal. Pupils are equal, round, and reactive to light. Right eye exhibits no discharge. Left eye exhibits no discharge. No scleral icterus. Neck: Normal range of motion. Neck supple. No thyromegaly present. Cardiovascular: Normal rate, regular rhythm and normal heart sounds. No murmur heard. Pulmonary/Chest: Effort normal and breath sounds normal. No respiratory distress. She has no wheezes. She has no rales. She exhibits no tenderness. Abdominal: Soft. Bowel sounds are normal. She exhibits distension. There is tenderness. There is no rebound and no guarding. Musculoskeletal: Normal range of motion. She exhibits no edema, tenderness or deformity. Lymphadenopathy: She has no cervical adenopathy. Neurological: She is alert and oriented to person, place, and time. No cranial nerve deficit. Coordination normal.  
Skin: Skin is warm and dry. No rash noted. She is not diaphoretic. No erythema. No pallor. Psychiatric: She has a normal mood and affect. Her behavior is normal. Judgment and thought content normal.  
  
 
Assessment and plan 1. Biliary cirrhosis ([facility] 2. Cirrhosis of liver with ascites, unspecified hepatic cirrhosis type ([facility] 3. Chronic pain syndrome 
 
- oxyCODONE-acetaminophen (PERCOCET) 5-325 mg per tablet; Take 1 Tab by mouth every six (6) hours as needed for Pain. Max Daily Amount: 4 Tabs. Dispense: 30 Tab; Refill: 0 
 
4. Chronic abdominal pain 5. Non-intractable vomiting with nausea, unspecified vomiting type - promethazine (PHENERGAN) 25 mg tablet; Take 1 Tab by mouth every six (6) hours as needed. Dispense: 30 Tab; Refill: 2 Current Outpatient Prescriptions Medication Sig Dispense Refill  promethazine (PHENERGAN) 25 mg tablet Take 1 Tab by mouth every six (6) hours as needed. 30 Tab 2  
 oxyCODONE-acetaminophen (PERCOCET) 5-325 mg per tablet Take 1 Tab by mouth every six (6) hours as needed for Pain. Max Daily Amount: 4 Tabs. 30 Tab 0  
 furosemide (LASIX) 20 mg tablet Take  by mouth daily.  spironolactone (ALDACTONE) 50 mg tablet Take  by mouth daily.  potassium chloride SR (K-TAB) 20 mEq tablet Take 1 Tab by mouth two (2) times a day. 14 Tab 0  
 lactulose (CHRONULAC) 10 gram/15 mL solution Take 30 g by mouth three (3) times daily.  omeprazole (PRILOSEC) 20 mg capsule Take 20 mg by mouth daily.  ondansetron hcl (ZOFRAN) 4 mg tablet Take 4 mg by mouth every six (6) hours as needed for Nausea.  rifAXIMin (XIFAXAN) 550 mg tablet Take 550 mg by mouth two (2) times a day.  methocarbamol (ROBAXIN) 500 mg tablet Take 1 Tab by mouth four (4) times daily.  12 Tab 0  
 
 
 Patient Active Problem List  
 Diagnosis Date Noted  Biliary cirrhosis ([facility] 09/11/2018  Cirrhosis of liver with ascites ([facility] 09/11/2018  Chronic pain syndrome 09/11/2018  Hepatic encephalopathy ([facility] 01/09/2018  Chronic abdominal pain 01/09/2018 Results for orders placed or performed during the hospital encounter of 07/16/18 LACTIC ACID Result Value Ref Range Lactic Acid 2.4 (H) 0.4 - 2.0 mmol/L Admission on 07/16/2018, Discharged on 07/16/2018 Component Date Value Ref Range Status  Lactic Acid 07/16/2018 2.4* 0.4 - 2.0 mmol/L Final  
Admission on 07/15/2018, Discharged on 07/15/2018 Component Date Value Ref Range Status  WBC 07/15/2018 2.6* 4.0 - 11.0 1000/mm3 Final  
 RBC 07/15/2018 3.07* 3.60 - 5.20 M/uL Final  
 HGB 07/15/2018 9.4* 13.0 - 17.2 gm/dl Final  
 HCT 07/15/2018 27.7* 37.0 - 50.0 % Final  
 MCV 07/15/2018 90.2  80.0 - 98.0 fL Final  
 MCH 07/15/2018 30.6  25.4 - 34.6 pg Final  
 MCHC 07/15/2018 33.9  30.0 - 36.0 gm/dl Final  
 PLATELET 99/75/5308 70* 140 - 450 1000/mm3 Final  
 RDW-SD 07/15/2018 65.5* 36.4 - 46.3   Final  
 NEUTROPHILS 07/15/2018 67.2* 34 - 64 % Final  
 LYMPHOCYTES 07/15/2018 19.5* 28 - 48 % Final  
 MONOCYTES 07/15/2018 9.4  1 - 13 % Final  
 EOSINOPHILS 07/15/2018 3.1  0 - 5 % Final  
 BASOPHILS 07/15/2018 0.4  0 - 3 % Final  
 Sodium 07/15/2018 140  136 - 145 mEq/L Final  
 Potassium 07/15/2018 4.0  3.5 - 5.1 mEq/L Final  
 Chloride 07/15/2018 106  98 - 107 mEq/L Final  
 CO2 07/15/2018 27  21 - 32 mEq/L Final  
 Glucose 07/15/2018 115* 74 - 106 mg/dl Final  
 BUN 07/15/2018 10  7 - 25 mg/dl Final  
 Creatinine 07/15/2018 0.8  0.6 - 1.3 mg/dl Final  
 GFR est AA 07/15/2018 >60.0    Final  
 Comment: THE [facility] LABORATORY WORKING GROUP STATES THAT THE MDRD STUDY EQUATION SHOULD ONLY BE USED ON 
INDIVIDUALS 18 OR OLDER.  THE REPORT ALSO NOTES THAT THE MDRD STUDY EQUATION HAS NOT BEEN 
 VALIDATED FOR USE WITH THE ELDERLY (OVER 79YEARS OF AGE), PREGNANT WOMEN, PATIENTS WITH SERIOUS 
COMORBID CONDITIONS, OR PERSONS WITH EXTREMES OF BODY SIZE, MUSCLE MASS, OR NUTRITIONAL STATUS. APPLICATION OF THE EQUATION TO THESE PATIENT GROUPS MAY LEAD TO ERRORS IN GFR ESTIMATION. GFR 
ESTIMATING EQUATIONS HAVE POORER AGREEMENT WITH MEASURED GFR FOR ILL HOSPITALIZED PATIENTS AND FOR PEOPLE WITH NEAR NORMAL KIDNEY FUNCTION THAN FOR SUBJECTS IN THE MDRD STUDY. VALIDATION 
STUDIES ARE IN PROGRESS TO EVALUATE THE MDRD STUDY EQUATION FOR ADDITIONAL ETHNIC GROUPS, THE 
ELDERLY, VARIOUS DISEASE CONDITIONS, AND PEOPLE WITH NORMAL KIDNEY FUNCTION. GFRA----REFERS TO  
GFRO---REFERS TO OTHER RACES 
REFERENCES AVAILABLE UPON REQUEST. 
  
 GFR est non-AA 07/15/2018 >60    Final  
 Calcium 07/15/2018 8.2* 8.5 - 10.1 mg/dl Final  
 AST (SGOT) 07/15/2018 51* 15 - 37 U/L Final  
 ALT (SGPT) 07/15/2018 22  12 - 78 U/L Final  
 Alk.  phosphatase 07/15/2018 237* 45 - 117 U/L Final  
 Bilirubin, total 07/15/2018 4.0* 0.2 - 1.0 mg/dl Final  
 Protein, total 07/15/2018 6.2* 6.4 - 8.2 gm/dl Final  
 Albumin 07/15/2018 2.3* 3.4 - 5.0 gm/dl Final  
 Anion gap 07/15/2018 7  5 - 15 mmol/L Final  
 Lipase 07/15/2018 324  73 - 393 U/L Final  
 Magnesium 07/15/2018 1.7  1.6 - 2.6 mg/dl Final  
 Lactic Acid 07/15/2018 2.4* 0.4 - 2.0 mmol/L Final  
 Glucose 07/15/2018 Negative  NEGATIVE,Negative mg/dl Final  
 Bilirubin 07/15/2018 Negative  NEGATIVE,Negative   Final  
 Ketone 07/15/2018 Negative  NEGATIVE,Negative mg/dl Final  
 Specific gravity 07/15/2018 1.015  1.005 - 1.030   Final  
 Blood 07/15/2018 Small* NEGATIVE,Negative   Final  
 pH (UA) 07/15/2018 6.5  5 - 9   Final  
 Protein 07/15/2018 Negative  NEGATIVE,Negative mg/dl Final  
 Urobilinogen 07/15/2018 0.2  0.0 - 1.0 EU/dl Final  
 Nitrites 07/15/2018 Negative  NEGATIVE,Negative   Final  
  Leukocyte Esterase 07/15/2018 Negative  NEGATIVE,Negative   Final  
 Color 07/15/2018 Other    Final  
 Appearance 07/15/2018 Other    Final  
 : [id number] Admission on 07/07/2018, Discharged on 07/08/2018 Component Date Value Ref Range Status  WBC 07/07/2018 3.1* 4.0 - 11.0 1000/mm3 Final  
 RBC 07/07/2018 3.18* 3.60 - 5.20 M/uL Final  
 HGB 07/07/2018 9.7* 13.0 - 17.2 gm/dl Final  
 HCT 07/07/2018 29.2* 37.0 - 50.0 % Final  
 MCV 07/07/2018 91.8  80.0 - 98.0 fL Final  
 MCH 07/07/2018 30.5  25.4 - 34.6 pg Final  
 MCHC 07/07/2018 33.2  30.0 - 36.0 gm/dl Final  
 PLATELET 53/94/0468 60* 140 - 450 1000/mm3 Final  
 MPV 07/07/2018 11.8* 6.0 - 10.0 fL Final  
 RDW-SD 07/07/2018 65.1* 36.4 - 46.3   Final  
 NRBC 07/07/2018 0  0 - 0   Final  
 IMMATURE GRANULOCYTES 07/07/2018 0.3  0.0 - 3.0 % Final  
 Comment: IG - Immature granulocytes (promyelocytes + myelocytes + metamyelocytes), their presence 
indicates a left shift. An IG > 3% may predict positive blood cultures with 98% specificity. 
(P<0.04) and 92% Positive Predictive Value (Dahlia-Aicha)1. Increased immature granulocytes 
assist with the detection of infection and/or inflammation and may be present at an early stage 
and are more sensitive and specific than band counts.  
  
 NEUTROPHILS 07/07/2018 65.0* 34 - 64 % Final  
 LYMPHOCYTES 07/07/2018 21.5* 28 - 48 % Final  
 MONOCYTES 07/07/2018 9.0  1 - 13 % Final  
 EOSINOPHILS 07/07/2018 3.2  0 - 5 % Final  
 BASOPHILS 07/07/2018 1.0  0 - 3 % Final  
 Sodium 07/07/2018 141  136 - 145 mEq/L Final  
 Potassium 07/07/2018 3.5  3.5 - 5.1 mEq/L Final  
 Chloride 07/07/2018 107  98 - 107 mEq/L Final  
 CO2 07/07/2018 24  21 - 32 mEq/L Final  
 Glucose 07/07/2018 119* 74 - 106 mg/dl Final  
 BUN 07/07/2018 12  7 - 25 mg/dl Final  
 Creatinine 07/07/2018 0.9  0.6 - 1.3 mg/dl Final  
 GFR est AA 07/07/2018 >60.0    Final  
 Comment: THE [facility]AT[employer] WORKING GROUP STATES THAT THE MDRD STUDY EQUATION SHOULD ONLY BE USED ON 
INDIVIDUALS 18 OR OLDER. THE REPORT ALSO NOTES THAT THE MDRD STUDY EQUATION HAS NOT BEEN 
VALIDATED FOR USE WITH THE ELDERLY (OVER 79YEARS OF AGE), PREGNANT WOMEN, PATIENTS WITH SERIOUS 
COMORBID CONDITIONS, OR PERSONS WITH EXTREMES OF BODY SIZE, MUSCLE MASS, OR NUTRITIONAL STATUS. APPLICATION OF THE EQUATION TO THESE PATIENT GROUPS MAY LEAD TO ERRORS IN GFR ESTIMATION. GFR 
ESTIMATING EQUATIONS HAVE POORER AGREEMENT WITH MEASURED GFR FOR ILL HOSPITALIZED PATIENTS AND FOR PEOPLE WITH NEAR NORMAL KIDNEY FUNCTION THAN FOR SUBJECTS IN THE MDRD STUDY. VALIDATION 
STUDIES ARE IN PROGRESS TO EVALUATE THE MDRD STUDY EQUATION FOR ADDITIONAL ETHNIC GROUPS, THE 
ELDERLY, VARIOUS DISEASE CONDITIONS, AND PEOPLE WITH NORMAL KIDNEY FUNCTION. GFRA----REFERS TO  
GFRO---REFERS TO OTHER RACES 
REFERENCES AVAILABLE UPON REQUEST. 
  
 GFR est non-AA 07/07/2018 >60    Final  
 Calcium 07/07/2018 8.4* 8.5 - 10.1 mg/dl Final  
 AST (SGOT) 07/07/2018 40* 15 - 37 U/L Final  
 ALT (SGPT) 07/07/2018 22  12 - 78 U/L Final  
 Alk. phosphatase 07/07/2018 254* 45 - 117 U/L Final  
 Bilirubin, total 07/07/2018 2.8* 0.2 - 1.0 mg/dl Final  
 Protein, total 07/07/2018 6.6  6.4 - 8.2 gm/dl Final  
 Albumin 07/07/2018 2.5* 3.4 - 5.0 gm/dl Final  
 Anion gap 07/07/2018 10  5 - 15 mmol/L Final  
 Lipase 07/07/2018 713* 73 - 393 U/L Final  
Admission on 07/03/2018, Discharged on 07/03/2018 Component Date Value Ref Range Status  WBC 07/03/2018 3.7* 4.0 - 11.0 1000/mm3 Final  
 RBC 07/03/2018 3.32* 3.60 - 5.20 M/uL Final  
 HGB 07/03/2018 10.3* 13.0 - 17.2 gm/dl Final  
 HCT 07/03/2018 29.8* 37.0 - 50.0 % Final  
 MCV 07/03/2018 89.8  80.0 - 98.0 fL Final  
 MCH 07/03/2018 31.0  25.4 - 34.6 pg Final  
 MCHC 07/03/2018 34.6  30.0 - 36.0 gm/dl Final  
 PLATELET 75/77/7405 56* 140 - 450 1000/mm3 Final  
  MPV 07/03/2018 12.2* 6.0 - 10.0 fL Final  
 RDW-SD 07/03/2018 62.5* 36.4 - 46.3   Final  
 NRBC 07/03/2018 0  0 - 0   Final  
 IMMATURE GRANULOCYTES 07/03/2018 0.3  0.0 - 3.0 % Final  
 Comment: IG - Immature granulocytes (promyelocytes + myelocytes + metamyelocytes), their presence 
indicates a left shift. An IG > 3% may predict positive blood cultures with 98% specificity. 
(P<0.04) and 92% Positive Predictive Value (Dahlia-Aicha)1. Increased immature granulocytes 
assist with the detection of infection and/or inflammation and may be present at an early stage 
and are more sensitive and specific than band counts.  NEUTROPHILS 07/03/2018 64.4* 34 - 64 % Final  
 LYMPHOCYTES 07/03/2018 21.0* 28 - 48 % Final  
 MONOCYTES 07/03/2018 8.9  1 - 13 % Final  
 EOSINOPHILS 07/03/2018 4.3  0 - 5 % Final  
 BASOPHILS 07/03/2018 1.1  0 - 3 % Final  
 Sodium 07/03/2018 138  136 - 145 mEq/L Final  
 Potassium 07/03/2018 3.2* 3.5 - 5.1 mEq/L Final  
 Chloride 07/03/2018 106  98 - 107 mEq/L Final  
 CO2 07/03/2018 25  21 - 32 mEq/L Final  
 Glucose 07/03/2018 107* 74 - 106 mg/dl Final  
 BUN 07/03/2018 8  7 - 25 mg/dl Final  
 Creatinine 07/03/2018 0.9  0.6 - 1.3 mg/dl Final  
 GFR est AA 07/03/2018 >60.0    Final  
 Comment: THE [facility]ATORY WORKING GROUP STATES THAT THE MDRD STUDY EQUATION SHOULD ONLY BE USED ON 
INDIVIDUALS 18 OR OLDER. THE REPORT ALSO NOTES THAT THE MDRD STUDY EQUATION HAS NOT BEEN 
VALIDATED FOR USE WITH THE ELDERLY (OVER 79YEARS OF AGE), PREGNANT WOMEN, PATIENTS WITH SERIOUS 
COMORBID CONDITIONS, OR PERSONS WITH EXTREMES OF BODY SIZE, MUSCLE MASS, OR NUTRITIONAL STATUS. APPLICATION OF THE EQUATION TO THESE PATIENT GROUPS MAY LEAD TO ERRORS IN GFR ESTIMATION. GFR 
ESTIMATING EQUATIONS HAVE POORER AGREEMENT WITH MEASURED GFR FOR ILL HOSPITALIZED PATIENTS AND FOR PEOPLE WITH NEAR NORMAL KIDNEY FUNCTION THAN FOR SUBJECTS IN THE MDRD STUDY.  VALIDATION 
 STUDIES ARE IN PROGRESS TO EVALUATE THE MDRD STUDY EQUATION FOR ADDITIONAL ETHNIC GROUPS, THE 
ELDERLY, VARIOUS DISEASE CONDITIONS, AND PEOPLE WITH NORMAL KIDNEY FUNCTION. GFRA----REFERS TO  
GFRO---REFERS TO OTHER RACES 
REFERENCES AVAILABLE UPON REQUEST. 
  
 GFR est non-AA 07/03/2018 >60    Final  
 Calcium 07/03/2018 8.0* 8.5 - 10.1 mg/dl Final  
 AST (SGOT) 07/03/2018 42* 15 - 37 U/L Final  
 ALT (SGPT) 07/03/2018 25  12 - 78 U/L Final  
 Alk. phosphatase 07/03/2018 288* 45 - 117 U/L Final  
 Bilirubin, total 07/03/2018 3.2* 0.2 - 1.0 mg/dl Final  
 Protein, total 07/03/2018 7.0  6.4 - 8.2 gm/dl Final  
 Albumin 07/03/2018 2.6* 3.4 - 5.0 gm/dl Final  
 Anion gap 07/03/2018 8  5 - 15 mmol/L Final  
 Lipase 07/03/2018 422* 73 - 393 U/L Final  
  
 
 
Follow-up Disposition: 
Return as needed.

## 2018-10-02 DIAGNOSIS — G89.4 CHRONIC PAIN SYNDROME: ICD-10-CM

## 2018-10-02 RX ORDER — OXYCODONE AND ACETAMINOPHEN 5; 325 MG/1; MG/1
1 TABLET ORAL
Qty: 30 TAB | Refills: 0 | OUTPATIENT
Start: 2018-10-02

## 2018-10-02 NOTE — TELEPHONE ENCOUNTER
Patient's daughter is calling ahead for the refill request of Percocet for her mother. She is aware that this prescription will have to be picked up in person.

## 2018-10-04 ENCOUNTER — OFFICE VISIT (OUTPATIENT)
Dept: FAMILY MEDICINE CLINIC | Age: 58
End: 2018-10-04

## 2018-10-04 VITALS
TEMPERATURE: 97.5 F | BODY MASS INDEX: 28.07 KG/M2 | SYSTOLIC BLOOD PRESSURE: 130 MMHG | HEIGHT: 60 IN | WEIGHT: 143 LBS | RESPIRATION RATE: 16 BRPM | OXYGEN SATURATION: 97 % | HEART RATE: 95 BPM | DIASTOLIC BLOOD PRESSURE: 60 MMHG

## 2018-10-04 DIAGNOSIS — G89.4 CHRONIC PAIN SYNDROME: ICD-10-CM

## 2018-10-04 DIAGNOSIS — K74.5 BILIARY CIRRHOSIS (HCC): Primary | ICD-10-CM

## 2018-10-04 DIAGNOSIS — R10.9 CHRONIC ABDOMINAL PAIN: ICD-10-CM

## 2018-10-04 DIAGNOSIS — G89.29 CHRONIC ABDOMINAL PAIN: ICD-10-CM

## 2018-10-04 RX ORDER — OXYCODONE AND ACETAMINOPHEN 5; 325 MG/1; MG/1
2 TABLET ORAL
Qty: 56 TAB | Refills: 0 | Status: SHIPPED | OUTPATIENT
Start: 2018-10-04 | End: 2018-10-11

## 2018-10-04 NOTE — PROGRESS NOTES
[facility]     Chief Complaint   Patient presents with    Medication Refill     Vitals:    10/04/18 0834   BP: 130/60   Pulse: 95   Resp: 16   Temp: 97.5 °F (36.4 °C)   TempSrc: Oral   SpO2: 97%   Weight: 143 lb (64.9 kg)   Height: 5' (1.524 m)   PainSc:   7         HPI: A known case of chronic liver cirrhosis who has chronic pain syndrome abdominal pain, she is following with [facility] to be placed in the transplant list, she is taking Percocet every 4-6 hours as needed for pain and she is here for her medication refill. She has no acute abdominal pain no new symptoms since last visit, She gets occasional vomiting she vomited yesterday twice. No no change in the nature of her abdominal pain. Patient understand that if she cannot tolerate liquid and she keep on vomiting she need to go to emergency room. Past Medical History:   Diagnosis Date    Arthropathy, unspecified, site unspecified     Cirrhosis ([address]     Esophageal reflux     Hemochromatosis     Hypertension     Liver failure (HCC)     Lower back pain     Migraine     Pancreatitis     Sinusitis     Uterine cancer ([address]      Past Surgical History:   Procedure Laterality Date    HX HERNIA REPAIR      HX LAP CHOLECYSTECTOMY      3/2007    HX TOTAL VAGINAL HYSTERECTOMY       Social History   Substance Use Topics    Smoking status: Former Smoker     Packs/day: 0.00     Quit date: 11/11/2017    Smokeless tobacco: Never Used    Alcohol use No       Family History   Problem Relation Age of Onset    Hypertension Father        Review of Systems   HENT: Negative for congestion, ear discharge, ear pain, hearing loss, sinus pain and tinnitus. Respiratory: Negative for cough, hemoptysis and sputum production. Cardiovascular: Negative for chest pain, palpitations and orthopnea. Gastrointestinal: Positive for abdominal pain, heartburn, nausea and vomiting. Negative for blood in stool, constipation and diarrhea.    Genitourinary: Negative for dysuria, frequency, hematuria and urgency. Musculoskeletal: Positive for back pain. Negative for myalgias. Neurological: Negative for dizziness, tingling, tremors, sensory change, speech change, focal weakness and headaches. Psychiatric/Behavioral: Negative for depression, hallucinations, substance abuse and suicidal ideas. The patient is not nervous/anxious. Physical Exam   Constitutional: She is oriented to person, place, and time. She appears well-developed and well-nourished. No distress. HENT:   Head: Normocephalic and atraumatic. Mouth/Throat: Oropharynx is clear and moist. No oropharyngeal exudate. Eyes: Conjunctivae are normal. Pupils are equal, round, and reactive to light. Right eye exhibits no discharge. Left eye exhibits no discharge. No scleral icterus. Neck: Normal range of motion. Neck supple. Cardiovascular: Normal rate, regular rhythm and normal heart sounds. No murmur heard. Pulmonary/Chest: Effort normal and breath sounds normal. No respiratory distress. She has no wheezes. She has no rales. She exhibits no tenderness. Abdominal: Soft. Bowel sounds are normal. She exhibits no distension. There is no tenderness. There is no rebound. Musculoskeletal: Normal range of motion. Neurological: She is alert and oriented to person, place, and time. No cranial nerve deficit. Coordination normal.   Skin: Skin is warm and dry. No rash noted. She is not diaphoretic. No erythema. No pallor. Psychiatric: She has a normal mood and affect. Her behavior is normal. Judgment and thought content normal.        Assessment and plan     Patient is trying to get in with pain management referral was given still waiting to get appointment. 1. Chronic pain syndrome    - oxyCODONE-acetaminophen (PERCOCET) 5-325 mg per tablet; Take 2 Tabs by mouth every six (6) hours as needed for Pain for up to 7 days. Max Daily Amount: 8 Tabs. Dispense: 56 Tab; Refill: 0    2.  Biliary cirrhosis ([facility]      3. Chronic abdominal pain      Current Outpatient Prescriptions   Medication Sig Dispense Refill    oxyCODONE-acetaminophen (PERCOCET) 5-325 mg per tablet Take 2 Tabs by mouth every six (6) hours as needed for Pain for up to 7 days. Max Daily Amount: 8 Tabs. 56 Tab 0    promethazine (PHENERGAN) 25 mg tablet Take 1 Tab by mouth every six (6) hours as needed. 30 Tab 2    furosemide (LASIX) 20 mg tablet Take  by mouth daily.  spironolactone (ALDACTONE) 50 mg tablet Take  by mouth daily.  methocarbamol (ROBAXIN) 500 mg tablet Take 1 Tab by mouth four (4) times daily. 12 Tab 0    potassium chloride SR (K-TAB) 20 mEq tablet Take 1 Tab by mouth two (2) times a day. 14 Tab 0    lactulose (CHRONULAC) 10 gram/15 mL solution Take 30 g by mouth three (3) times daily.  omeprazole (PRILOSEC) 20 mg capsule Take 20 mg by mouth daily.  ondansetron hcl (ZOFRAN) 4 mg tablet Take 4 mg by mouth every six (6) hours as needed for Nausea.  rifAXIMin (XIFAXAN) 550 mg tablet Take 550 mg by mouth two (2) times a day.          Patient Active Problem List    Diagnosis Date Noted    Biliary cirrhosis ([facility] 09/11/2018    Cirrhosis of liver with ascites ([facility] 09/11/2018    Chronic pain syndrome 09/11/2018    Hepatic encephalopathy ([facility] 01/09/2018    Chronic abdominal pain 01/09/2018     Results for orders placed or performed during the hospital encounter of 07/16/18   LACTIC ACID   Result Value Ref Range    Lactic Acid 2.4 (H) 0.4 - 2.0 mmol/L     Admission on 07/16/2018, Discharged on 07/16/2018   Component Date Value Ref Range Status    Lactic Acid 07/16/2018 2.4* 0.4 - 2.0 mmol/L Final   Admission on 07/15/2018, Discharged on 07/15/2018   Component Date Value Ref Range Status    WBC 07/15/2018 2.6* 4.0 - 11.0 1000/mm3 Final    RBC 07/15/2018 3.07* 3.60 - 5.20 M/uL Final    HGB 07/15/2018 9.4* 13.0 - 17.2 gm/dl Final    HCT 07/15/2018 27.7* 37.0 - 50.0 % Final    MCV 07/15/2018 90.2  80.0 - 98.0 fL Final    MCH 07/15/2018 30.6  25.4 - 34.6 pg Final    MCHC 07/15/2018 33.9  30.0 - 36.0 gm/dl Final    PLATELET 86/78/5491 70* 140 - 450 1000/mm3 Final    RDW-SD 07/15/2018 65.5* 36.4 - 46.3   Final    NEUTROPHILS 07/15/2018 67.2* 34 - 64 % Final    LYMPHOCYTES 07/15/2018 19.5* 28 - 48 % Final    MONOCYTES 07/15/2018 9.4  1 - 13 % Final    EOSINOPHILS 07/15/2018 3.1  0 - 5 % Final    BASOPHILS 07/15/2018 0.4  0 - 3 % Final    Sodium 07/15/2018 140  136 - 145 mEq/L Final    Potassium 07/15/2018 4.0  3.5 - 5.1 mEq/L Final    Chloride 07/15/2018 106  98 - 107 mEq/L Final    CO2 07/15/2018 27  21 - 32 mEq/L Final    Glucose 07/15/2018 115* 74 - 106 mg/dl Final    BUN 07/15/2018 10  7 - 25 mg/dl Final    Creatinine 07/15/2018 0.8  0.6 - 1.3 mg/dl Final    GFR est AA 07/15/2018 >60.0    Final    Comment: THE [employer]ING GROUP STATES THAT THE MDRD STUDY EQUATION SHOULD ONLY BE USED ON  INDIVIDUALS 18 OR OLDER. THE REPORT ALSO NOTES THAT THE MDRD STUDY EQUATION HAS NOT BEEN  VALIDATED FOR USE WITH THE ELDERLY (OVER 79YEARS OF AGE), PREGNANT WOMEN, PATIENTS WITH SERIOUS  COMORBID CONDITIONS, OR PERSONS WITH EXTREMES OF BODY SIZE, MUSCLE MASS, OR NUTRITIONAL STATUS. APPLICATION OF THE EQUATION TO THESE PATIENT GROUPS MAY LEAD TO ERRORS IN GFR ESTIMATION. GFR  ESTIMATING EQUATIONS HAVE POORER AGREEMENT WITH MEASURED GFR FOR ILL HOSPITALIZED PATIENTS AND  FOR PEOPLE WITH NEAR NORMAL KIDNEY FUNCTION THAN FOR SUBJECTS IN THE MDRD STUDY. VALIDATION  STUDIES ARE IN PROGRESS TO EVALUATE THE MDRD STUDY EQUATION FOR ADDITIONAL ETHNIC GROUPS, THE  ELDERLY, VARIOUS DISEASE CONDITIONS, AND PEOPLE WITH NORMAL KIDNEY FUNCTION.   GFRA----REFERS TO   GFRO---REFERS TO OTHER RACES  REFERENCES AVAILABLE UPON REQUEST.      GFR est non-AA 07/15/2018 >60    Final    Calcium 07/15/2018 8.2* 8.5 - 10.1 mg/dl Final    AST (SGOT) 07/15/2018 51* 15 - 37 U/L Final    ALT (SGPT) 07/15/2018 22  12 - 78 U/L Final    Alk. phosphatase 07/15/2018 237* 45 - 117 U/L Final    Bilirubin, total 07/15/2018 4.0* 0.2 - 1.0 mg/dl Final    Protein, total 07/15/2018 6.2* 6.4 - 8.2 gm/dl Final    Albumin 07/15/2018 2.3* 3.4 - 5.0 gm/dl Final    Anion gap 07/15/2018 7  5 - 15 mmol/L Final    Lipase 07/15/2018 324  73 - 393 U/L Final    Magnesium 07/15/2018 1.7  1.6 - 2.6 mg/dl Final    Lactic Acid 07/15/2018 2.4* 0.4 - 2.0 mmol/L Final    Glucose 07/15/2018 Negative  NEGATIVE,Negative mg/dl Final    Bilirubin 07/15/2018 Negative  NEGATIVE,Negative   Final    Ketone 07/15/2018 Negative  NEGATIVE,Negative mg/dl Final    Specific gravity 07/15/2018 1.015  1.005 - 1.030   Final    Blood 07/15/2018 Small* NEGATIVE,Negative   Final    pH (UA) 07/15/2018 6.5  5 - 9   Final    Protein 07/15/2018 Negative  NEGATIVE,Negative mg/dl Final    Urobilinogen 07/15/2018 0.2  0.0 - 1.0 EU/dl Final    Nitrites 07/15/2018 Negative  NEGATIVE,Negative   Final    Leukocyte Esterase 07/15/2018 Negative  NEGATIVE,Negative   Final    Color 07/15/2018 Other    Final    Appearance 07/15/2018 Other    Final    : [id number]   Admission on 07/07/2018, Discharged on 07/08/2018   Component Date Value Ref Range Status    WBC 07/07/2018 3.1* 4.0 - 11.0 1000/mm3 Final    RBC 07/07/2018 3.18* 3.60 - 5.20 M/uL Final    HGB 07/07/2018 9.7* 13.0 - 17.2 gm/dl Final    HCT 07/07/2018 29.2* 37.0 - 50.0 % Final    MCV 07/07/2018 91.8  80.0 - 98.0 fL Final    MCH 07/07/2018 30.5  25.4 - 34.6 pg Final    MCHC 07/07/2018 33.2  30.0 - 36.0 gm/dl Final    PLATELET 75/33/0648 60* 140 - 450 1000/mm3 Final    MPV 07/07/2018 11.8* 6.0 - 10.0 fL Final    RDW-SD 07/07/2018 65.1* 36.4 - 46.3   Final    NRBC 07/07/2018 0  0 - 0   Final    IMMATURE GRANULOCYTES 07/07/2018 0.3  0.0 - 3.0 % Final    Comment: IG - Immature granulocytes (promyelocytes + myelocytes + metamyelocytes), their presence  indicates a left shift.  An IG > 3% may predict positive blood cultures with 98% specificity.  (P<0.04) and 92% Positive Predictive Value (Dahlia-Aicha)1. Increased immature granulocytes  assist with the detection of infection and/or inflammation and may be present at an early stage  and are more sensitive and specific than band counts.  NEUTROPHILS 07/07/2018 65.0* 34 - 64 % Final    LYMPHOCYTES 07/07/2018 21.5* 28 - 48 % Final    MONOCYTES 07/07/2018 9.0  1 - 13 % Final    EOSINOPHILS 07/07/2018 3.2  0 - 5 % Final    BASOPHILS 07/07/2018 1.0  0 - 3 % Final    Sodium 07/07/2018 141  136 - 145 mEq/L Final    Potassium 07/07/2018 3.5  3.5 - 5.1 mEq/L Final    Chloride 07/07/2018 107  98 - 107 mEq/L Final    CO2 07/07/2018 24  21 - 32 mEq/L Final    Glucose 07/07/2018 119* 74 - 106 mg/dl Final    BUN 07/07/2018 12  7 - 25 mg/dl Final    Creatinine 07/07/2018 0.9  0.6 - 1.3 mg/dl Final    GFR est AA 07/07/2018 >60.0    Final    Comment: THE [employer] WORKING GROUP STATES THAT THE MDRD STUDY EQUATION SHOULD ONLY BE USED ON  INDIVIDUALS 18 OR OLDER. THE REPORT ALSO NOTES THAT THE MDRD STUDY EQUATION HAS NOT BEEN  VALIDATED FOR USE WITH THE ELDERLY (OVER 79YEARS OF AGE), PREGNANT WOMEN, PATIENTS WITH SERIOUS  COMORBID CONDITIONS, OR PERSONS WITH EXTREMES OF BODY SIZE, MUSCLE MASS, OR NUTRITIONAL STATUS. APPLICATION OF THE EQUATION TO THESE PATIENT GROUPS MAY LEAD TO ERRORS IN GFR ESTIMATION. GFR  ESTIMATING EQUATIONS HAVE POORER AGREEMENT WITH MEASURED GFR FOR ILL HOSPITALIZED PATIENTS AND  FOR PEOPLE WITH NEAR NORMAL KIDNEY FUNCTION THAN FOR SUBJECTS IN THE MDRD STUDY. VALIDATION  STUDIES ARE IN PROGRESS TO EVALUATE THE MDRD STUDY EQUATION FOR ADDITIONAL ETHNIC GROUPS, THE  ELDERLY, VARIOUS DISEASE CONDITIONS, AND PEOPLE WITH NORMAL KIDNEY FUNCTION.   GFRA----REFERS TO   GFRO---REFERS TO OTHER RACES  REFERENCES AVAILABLE UPON REQUEST.      GFR est non-AA 07/07/2018 >60    Final    Calcium 07/07/2018 8.4* 8.5 - 10.1 mg/dl Final  AST (SGOT) 07/07/2018 40* 15 - 37 U/L Final    ALT (SGPT) 07/07/2018 22  12 - 78 U/L Final    Alk. phosphatase 07/07/2018 254* 45 - 117 U/L Final    Bilirubin, total 07/07/2018 2.8* 0.2 - 1.0 mg/dl Final    Protein, total 07/07/2018 6.6  6.4 - 8.2 gm/dl Final    Albumin 07/07/2018 2.5* 3.4 - 5.0 gm/dl Final    Anion gap 07/07/2018 10  5 - 15 mmol/L Final    Lipase 07/07/2018 713* 73 - 393 U/L Final          Follow-up Disposition:  Return if symptoms worsen or fail to improve.

## 2018-10-12 ENCOUNTER — OFFICE VISIT (OUTPATIENT)
Dept: FAMILY MEDICINE CLINIC | Age: 58
End: 2018-10-12

## 2018-10-12 VITALS
TEMPERATURE: 98.4 F | DIASTOLIC BLOOD PRESSURE: 78 MMHG | RESPIRATION RATE: 18 BRPM | OXYGEN SATURATION: 98 % | HEIGHT: 60 IN | BODY MASS INDEX: 28.27 KG/M2 | SYSTOLIC BLOOD PRESSURE: 126 MMHG | HEART RATE: 94 BPM | WEIGHT: 144 LBS

## 2018-10-12 DIAGNOSIS — R10.9 CHRONIC ABDOMINAL PAIN: ICD-10-CM

## 2018-10-12 DIAGNOSIS — G89.29 CHRONIC ABDOMINAL PAIN: ICD-10-CM

## 2018-10-12 DIAGNOSIS — K74.5 BILIARY CIRRHOSIS (HCC): Primary | ICD-10-CM

## 2018-10-12 DIAGNOSIS — G89.4 CHRONIC PAIN SYNDROME: ICD-10-CM

## 2018-10-12 RX ORDER — HYDROCODONE BITARTRATE AND ACETAMINOPHEN 10; 325 MG/1; MG/1
1 TABLET ORAL
Qty: 10 TAB | Refills: 0 | Status: SHIPPED | OUTPATIENT
Start: 2018-10-12 | End: 2018-10-17 | Stop reason: SDUPTHER

## 2018-10-12 RX ORDER — HYDROCODONE BITARTRATE AND ACETAMINOPHEN 10; 325 MG/1; MG/1
1 TABLET ORAL
COMMUNITY
End: 2018-10-17 | Stop reason: SDUPTHER

## 2018-10-12 RX ORDER — NALOXONE HYDROCHLORIDE 4 MG/.1ML
SPRAY NASAL
Qty: 1 EACH | Refills: 0 | Status: SHIPPED | OUTPATIENT
Start: 2018-10-12

## 2018-10-12 NOTE — MR AVS SNAPSHOT
[address] [facility] 
 
 
 [address] [facility] [address] [facility] [place], [facility] [address] 
[phone] Patient: Celina Mathias MRN: [id number] IEP:[id number] Visit Information Date & Time Provider Department Dept. Phone Encounter #  
 10/12/2018  2:30 PM Ana Raymundo, [address] [facility] [phone] [id number] Your Appointments 10/18/2018 11:30 AM  
Follow Up with Sixto Felix MD  
[address] [facility] ([address]) Appt Note: med refill [address] [facility] [address] [place] [address]  
[phone]  
  
   
 [address] [address] Upcoming Health Maintenance Date Due Hepatitis C Screening 1960 Pneumococcal 19-64 Medium Risk (1 of 1 - PPSV23) 7/4/1981 DTaP/Tdap/Td series (1 - Tdap) 7/4/1983 PAP AKA CERVICAL CYTOLOGY 7/4/1983 Shingrix Vaccine Age 50> (1 of 2) 7/4/2012 BREAST CANCER SCRN MAMMOGRAM 7/4/2012 FOBT Q 1 YEAR AGE 50-75 7/4/2012 Influenza Age 5 to Adult 8/1/2018 Allergies as of 10/12/2018  Review Complete On: 10/4/2018 By: Dexter Schmidt LPN Severity Noted Reaction Type Reactions Hydromorphone High 02/07/2017    Anaphylaxis Ativan [Lorazepam]  02/07/2017    Other (comments) Betamethasone  02/07/2017    Other (comments) Chlorzoxazone  02/07/2017    Other (comments) Codeine  02/07/2017    Other (comments) Hallucinations Diphenhydramine  02/07/2017    Hives, Rash Erythromycin  02/07/2017    Other (comments) Ibuprofen  02/07/2017    Other (comments) Gi bleeding Iodinated Contrast- Oral And Iv Dye  02/07/2017    Shortness of Breath Ketorolac Tromethamine  02/07/2017    Rash, Swelling Nubain [Nalbuphine]  02/07/2017    Other (comments) Sumatriptan  02/07/2017    Nausea and Vomiting Tramadol  02/07/2017    Other (comments) Tremors; skin irritation Current Immunizations  Never Reviewed No immunizations on file. Not reviewed this visit You Were Diagnosed With   
  
 Codes Comments Biliary cirrhosis ([facility]    -  Primary ICD-10-CM: B01.3 ICD-9-CM: 571.6 Chronic pain syndrome     ICD-10-CM: G89.4 ICD-9-CM: 338. 4 Chronic abdominal pain     ICD-10-CM: R10.9, G89.29 ICD-9-CM: 789.00, 338.29 Vitals BP Pulse Temp Resp Height(growth percentile) Weight(growth percentile) 126/78 (BP 1 Location: Right arm, BP Patient Position: Sitting) 94 98.4 °F (36.9 °C) (Oral) 18 5' (1.524 m) 144 lb (65.3 kg) SpO2 BMI OB Status Smoking Status 98% 28.12 kg/m2 Hysterectomy Former Smoker BMI and BSA Data Body Mass Index Body Surface Area  
 28.12 kg/m 2 1.66 m 2 Preferred Pharmacy Pharmacy Name Phone [facility] [phone] Your Updated Medication List  
  
   
This list is accurate as of 10/12/18  3:18 PM.  Always use your most recent med list.  
  
  
  
  
 lactulose 10 gram/15 mL solution Commonly known as:  Linzie Reagin Take 30 g by mouth three (3) times daily. LASIX 20 mg tablet Generic drug:  furosemide Take  by mouth daily. naloxone 4 mg/actuation nasal spray Commonly known as:  [employer] Use 1 spray intranasally, then discard. Repeat with new spray every 2 min as needed for opioid overdose symptoms, alternating nostrils. * NORCO  mg tablet Generic drug:  HYDROcodone-acetaminophen Take 1 Tab by mouth.  
  
 * HYDROcodone-acetaminophen  mg tablet Commonly known as:  Arik Parkers Take 1 Tab by mouth two (2) times daily as needed for Pain for up to 5 days. Max Daily Amount: 2 Tabs. omeprazole 20 mg capsule Commonly known as:  PRILOSEC Take 20 mg by mouth daily. ondansetron hcl 4 mg tablet Commonly known as:  Netta Dress Take 4 mg by mouth every six (6) hours as needed for Nausea. potassium chloride SR 20 mEq tablet Commonly known as:  K-TAB Take 1 Tab by mouth two (2) times a day. promethazine 25 mg tablet Commonly known as:  PHENERGAN Take 1 Tab by mouth every six (6) hours as needed. spironolactone 50 mg tablet Commonly known as:  ALDACTONE Take  by mouth daily. XIFAXAN 550 mg tablet Generic drug:  rifAXIMin Take 550 mg by mouth two (2) times a day. * Notice: This list has 2 medication(s) that are the same as other medications prescribed for you. Read the directions carefully, and ask your doctor or other care provider to review them with you. Prescriptions Printed Refills HYDROcodone-acetaminophen (NORCO)  mg tablet 0 Sig: Take 1 Tab by mouth two (2) times daily as needed for Pain for up to 5 days. Max Daily Amount: 2 Tabs. Class: Print Route: Oral  
  
Prescriptions Sent to Pharmacy Refills  
 naloxone (NARCAN) 4 mg/actuation nasal spray 0 Sig: Use 1 spray intranasally, then discard. Repeat with new spray every 2 min as needed for opioid overdose symptoms, alternating nostrils. Class: Normal  
 Pharmacy: [facility], [address] [place] [address], [address] [facility] #: [phone] Introducing [facility]! [facility] introduces shopp patient portal. Now you can access parts of your medical record, email your doctor's office, and request medication refills online. 1. In your internet browser, go to [email or web address] 2. Click on the First Time User? Click Here link in the Sign In box. You will see the New Member Sign Up page. 3. Enter your shopp Access Code exactly as it appears below. You will not need to use this code after youve completed the sign-up process. If you do not sign up before the expiration date, you must request a new code. · shopp Access Code: [email or web address] Expires: 12/10/2018  3:05 PM 
 
 4. Enter the last four digits of your Social Security Number (xxxx) and Date of Birth (mm/dd/yyyy) as indicated and click Submit. You will be taken to the next sign-up page. 5. Create a [employer] ID. This will be your [employer] login ID and cannot be changed, so think of one that is secure and easy to remember. 6. Create a [employer] password. You can change your password at any time. 7. Enter your Password Reset Question and Answer. This can be used at a later time if you forget your password. 8. Enter your e-mail address. You will receive e-mail notification when new information is available in [address] 9. Click Sign Up. You can now view and download portions of your medical record. 10. Click the Download Summary menu link to download a portable copy of your medical information. If you have questions, please visit the Frequently Asked Questions section of the [employer] website. Remember, [employer] is NOT to be used for urgent needs. For medical emergencies, dial 911. Now available from your iPhone and Android! Please provide this summary of care documentation to your next provider. Your primary care clinician is listed as Adore Haas. If you have any questions after today's visit, please call [phone].

## 2018-10-12 NOTE — PROGRESS NOTES
HPI  This is a 57-year-old female presenting to center for follow-up of biliary cirrhosis and chronic abdominal pain. She was seen at the emergency department 4 days ago and was given 2 days worth of Norco and she went yesterday again because of the abdominal pain being worse and she was given 2 days of Norco again. Per daughter, the pain management at [facility] is willing to take her mother as a patient but told her that they did not have a referral so she faxed the referral again to them. She will contact them today to see if that process is started and so an appointment could be made soon to see her mother. Past Medical History:   Diagnosis Date    Arthropathy, unspecified, site unspecified     [facility] ([facility]     Esophageal reflux     Hemochromatosis     Hypertension     Liver failure ([facility]     Lower back pain     Migraine     Pancreatitis     Sinusitis     Uterine cancer ([facility]      . Current Outpatient Prescriptions on File Prior to Visit   Medication Sig Dispense Refill    promethazine (PHENERGAN) 25 mg tablet Take 1 Tab by mouth every six (6) hours as needed. 30 Tab 2    furosemide (LASIX) 20 mg tablet Take  by mouth daily.  spironolactone (ALDACTONE) 50 mg tablet Take  by mouth daily.  potassium chloride SR (K-TAB) 20 mEq tablet Take 1 Tab by mouth two (2) times a day. 14 Tab 0    lactulose (CHRONULAC) 10 gram/15 mL solution Take 30 g by mouth three (3) times daily.  omeprazole (PRILOSEC) 20 mg capsule Take 20 mg by mouth daily.  ondansetron hcl (ZOFRAN) 4 mg tablet Take 4 mg by mouth every six (6) hours as needed for Nausea.  rifAXIMin (XIFAXAN) 550 mg tablet Take 550 mg by mouth two (2) times a day.  [] oxyCODONE-acetaminophen (PERCOCET) 5-325 mg per tablet Take 2 Tabs by mouth every six (6) hours as needed for Pain for up to 7 days. Max Daily Amount: 8 Tabs. 56 Tab 0     No current facility-administered medications on file prior to visit. ROS  Constitutional: Denies fever or chills  Musculoskeletal: positive for lower back pain  GI: positive for abdominal pain; no nausea presently      Objective  Visit Vitals    /78 (BP 1 Location: Right arm, BP Patient Position: Sitting)    Pulse 94    Temp 98.4 °F (36.9 °C) (Oral)    Resp 18    Ht 5' (1.524 m)    Wt 144 lb (65.3 kg)    SpO2 98%    BMI 28.12 kg/m2       Physical Exam:  Constitutional: in no apparent distress; skin is jaundiced  Musculoskeletal: not examined  Abdomen: middle is tender to palpation     Diagnoses and all orders for this visit:    1. Biliary cirrhosis ([facility]    2. Chronic pain syndrome  -     HYDROcodone-acetaminophen (NORCO)  mg tablet; Take 1 Tab by mouth two (2) times daily as needed for Pain for up to 5 days. Max Daily Amount: 2 Tabs. -     naloxone (NARCAN) 4 mg/actuation nasal spray; Use 1 spray intranasally, then discard. Repeat with new spray every 2 min as needed for opioid overdose symptoms, alternating nostrils. 3. Chronic abdominal pain  -     HYDROcodone-acetaminophen (NORCO)  mg tablet; Take 1 Tab by mouth two (2) times daily as needed for Pain for up to 5 days. Max Daily Amount: 2 Tabs. -     naloxone (NARCAN) 4 mg/actuation nasal spray; Use 1 spray intranasally, then discard. Repeat with new spray every 2 min as needed for opioid overdose symptoms, alternating nostrils. Patient and daughter informed that they will not be able to come back to the center both verbalized understanding and the daughter is to make sure that she gets the appointment with the [facility] set up as soon as possible. Prescription given for 10 mg of the Norco because according to the daughter and the patient that when the patient has had the 10 mg in the past, that they break it in half in order for the patient to take less of the Tylenol because of her liver issue. Patient is instructed to go to the emergency department if symptoms worsen.   Questions answered and patient verbalized understanding and agreed with plan.     DEEPTI Keys

## 2018-10-12 NOTE — PROGRESS NOTES
Danny Gonzalez is a 66 y.o. female (: 1960) presenting to address:    Chief Complaint   Patient presents with   [facility] ED Follow-up     Claudetta Doing on 10/11/18 for abdominal pain       Vitals:    10/12/18 1433   BP: 126/78   Pulse: 94   Resp: 18   Temp: 98.4 °F (36.9 °C)   TempSrc: Oral   SpO2: 98%   Weight: 144 lb (65.3 kg)   Height: 5' (1.524 m)   PainSc:   5   PainLoc: Abdomen       Hearing/Vision:   No exam data present    Learning Assessment:     Learning Assessment 2018   PRIMARY LEARNER Patient   PRIMARY LANGUAGE ENGLISH   LEARNER PREFERENCE PRIMARY PICTURES     LISTENING     DEMONSTRATION     VIDEOS   ANSWERED BY patient   RELATIONSHIP SELF     Depression Screening:     PHQ over the last two weeks 10/12/2018   Little interest or pleasure in doing things Not at all   Feeling down, depressed, irritable, or hopeless Not at all   Total Score PHQ 2 0     Fall Risk Assessment:     Fall Risk Assessment, last 12 mths 2018   Able to walk? Yes   Fall in past 12 months? No     Abuse Screening:     Abuse Screening Questionnaire 2018   Do you ever feel afraid of your partner? N   Are you in a relationship with someone who physically or mentally threatens you? N   Is it safe for you to go home? Y     Coordination of Care Questionaire:   1. Have you been to the ER, urgent care clinic since your last visit? Hospitalized since your last visit? YES    2. Have you seen or consulted any other health care providers outside of the [address] [place] [address] since your last visit? Include any pap smears or colon screening.  NO patient

## 2018-10-15 ENCOUNTER — OFFICE VISIT (OUTPATIENT)
Dept: FAMILY MEDICINE CLINIC | Age: 58
End: 2018-10-15

## 2018-10-15 VITALS
WEIGHT: 147 LBS | DIASTOLIC BLOOD PRESSURE: 79 MMHG | BODY MASS INDEX: 28.86 KG/M2 | HEIGHT: 60 IN | HEART RATE: 98 BPM | SYSTOLIC BLOOD PRESSURE: 131 MMHG | OXYGEN SATURATION: 98 % | RESPIRATION RATE: 17 BRPM | TEMPERATURE: 98.2 F

## 2018-10-15 DIAGNOSIS — G89.4 CHRONIC PAIN SYNDROME: ICD-10-CM

## 2018-10-15 DIAGNOSIS — K74.60 CIRRHOSIS OF LIVER WITH ASCITES, UNSPECIFIED HEPATIC CIRRHOSIS TYPE (HCC): ICD-10-CM

## 2018-10-15 DIAGNOSIS — K74.5 BILIARY CIRRHOSIS (HCC): Primary | ICD-10-CM

## 2018-10-15 DIAGNOSIS — R18.8 CIRRHOSIS OF LIVER WITH ASCITES, UNSPECIFIED HEPATIC CIRRHOSIS TYPE (HCC): ICD-10-CM

## 2018-10-15 DIAGNOSIS — G89.29 CHRONIC ABDOMINAL PAIN: ICD-10-CM

## 2018-10-15 DIAGNOSIS — R10.9 CHRONIC ABDOMINAL PAIN: ICD-10-CM

## 2018-10-15 NOTE — PROGRESS NOTES
Demetrius Thakkar is a 66 y.o. female presents to office for abdominal pain.     Medication list has been reviewed with Demetrius Thakkar and updated as of today's date     Health Maintenance items with a due date reviewed with patient:  Health Maintenance Due   Topic Date Due    Hepatitis C Screening  1960    Pneumococcal 19-64 Medium Risk (1 of 1 - PPSV23) 07/04/1981    DTaP/Tdap/Td series (1 - Tdap) 07/04/1983    PAP AKA CERVICAL CYTOLOGY  07/04/1983    Shingrix Vaccine Age 50> (1 of 2) 07/04/2012    BREAST CANCER SCRN MAMMOGRAM  07/04/2012    FOBT Q 1 YEAR AGE 50-75  07/04/2012    Influenza Age 9 to Adult  08/01/2018

## 2018-10-15 NOTE — PROGRESS NOTES
Marylu Arriaza   No chief complaint on file. Vitals:    10/15/18 1308   BP: 131/79   Pulse: 98   Resp: 17   Temp: 98.2 °F (36.8 °C)   TempSrc: Oral   SpO2: 98%   Weight: 147 lb (66.7 kg)   Height: 5' (1.524 m)   PainSc:   8         HPI: Patient is here on follow-up from the emergency room few days ago due to lower leg swelling, she was seen in the office by DEEPTI Oleary , patient was given 64 Percocet on October 4 by myself that the medication given swelling of her abdomen and her lower legs!!!  Daughter stating that she took Percocet back to [facility]!!! Review the patient  it is very many activities in the  total of 28 prescribe her over the last year patient also does not use a consistent pharmacy use different pharmacies. Urine drug test was ordered today and and patient daughter signed a contract for controlled substance agreement. Has been referred to pain management go only to pain management who dispensed opioids. Patient will be seen gastroenterology next month, and her appointment with the transplant team is in January 2019. Abdominal pain on and off nausea no vomiting. Constipation that resolves with taking lactulose.   Past Medical History:   Diagnosis Date    Arthropathy, unspecified, site unspecified     Cirrhosis ([address]     Esophageal reflux     Hemochromatosis     Hypertension     Liver failure (HCC)     Lower back pain     Migraine     Pancreatitis     Sinusitis     Uterine cancer ([address]      Past Surgical History:   Procedure Laterality Date    HX HERNIA REPAIR      HX LAP CHOLECYSTECTOMY      3/2007    HX TOTAL VAGINAL HYSTERECTOMY       Social History   Substance Use Topics    Smoking status: Former Smoker     Packs/day: 0.00     Quit date: 11/11/2017    Smokeless tobacco: Never Used    Alcohol use No       Family History   Problem Relation Age of Onset    Hypertension Father        Review of Systems   Constitutional: Negative for chills, fever, malaise/fatigue and weight loss. HENT: Negative for congestion, ear discharge, ear pain, hearing loss, nosebleeds, sinus pain and sore throat. Eyes: Negative for blurred vision, double vision and discharge. Respiratory: Negative for cough, hemoptysis, sputum production and shortness of breath. Cardiovascular: Negative for chest pain, palpitations, claudication and leg swelling. Gastrointestinal: Positive for abdominal pain and nausea. Negative for constipation, diarrhea, heartburn and vomiting. Genitourinary: Negative for dysuria, frequency, hematuria and urgency. Musculoskeletal: Positive for back pain. Negative for myalgias and neck pain. Skin: Negative for itching and rash. Neurological: Negative for dizziness, tingling, sensory change, speech change, focal weakness, seizures, weakness and headaches. Psychiatric/Behavioral: Negative for depression, hallucinations, substance abuse and suicidal ideas. Physical Exam   Constitutional: She is oriented to person, place, and time. She appears well-developed and well-nourished. No distress. HENT:   Head: Normocephalic and atraumatic. Mouth/Throat: Oropharynx is clear and moist. No oropharyngeal exudate. Eyes: Conjunctivae are normal. Pupils are equal, round, and reactive to light. Right eye exhibits no discharge. Left eye exhibits no discharge. No scleral icterus. Neck: Neck supple. No thyromegaly present. Cardiovascular: Normal rate, regular rhythm and normal heart sounds. No murmur heard. Pulmonary/Chest: Effort normal and breath sounds normal. No respiratory distress. She has no wheezes. She has no rales. She exhibits no tenderness. Abdominal: Soft. Bowel sounds are normal. She exhibits no distension. There is tenderness. There is no rebound. Mild upper quadrant tenderness    Musculoskeletal: Normal range of motion. She exhibits no edema, tenderness or deformity. Lymphadenopathy:     She has no cervical adenopathy. Neurological: She is alert and oriented to person, place, and time. No cranial nerve deficit. Coordination normal.   Skin: Skin is warm and dry. No rash noted. She is not diaphoretic. No erythema. No pallor. Psychiatric: She has a normal mood and affect. Her behavior is normal. Judgment and thought content normal.        Assessment and plan       No narcotic will be dispensed today will wait for the urine drug screen. Patient is strongly encouraged to get started with pain management, eventually patient need to be tapered off narcotics, they had been offered pain management but they do not prescribe opioids and patient and her daughter declined the referral!!! Discussed with Ko Bonilla in the  staff still working on placing patient for pain management. 1. Biliary cirrhosis ([facility]      2. Cirrhosis of liver with ascites, unspecified hepatic cirrhosis type ([facility]      3. Chronic pain syndrome    - 11-DRUG SCREEN, URINE; Future    4. Chronic abdominal pain    - 11-DRUG SCREEN, URINE; Future    Current Outpatient Prescriptions   Medication Sig Dispense Refill    HYDROcodone-acetaminophen (NORCO)  mg tablet Take 1 Tab by mouth.  HYDROcodone-acetaminophen (NORCO)  mg tablet Take 1 Tab by mouth two (2) times daily as needed for Pain for up to 5 days. Max Daily Amount: 2 Tabs. 10 Tab 0    naloxone (NARCAN) 4 mg/actuation nasal spray Use 1 spray intranasally, then discard. Repeat with new spray every 2 min as needed for opioid overdose symptoms, alternating nostrils. 1 Each 0    promethazine (PHENERGAN) 25 mg tablet Take 1 Tab by mouth every six (6) hours as needed. 30 Tab 2    furosemide (LASIX) 20 mg tablet Take  by mouth daily.  spironolactone (ALDACTONE) 50 mg tablet Take  by mouth daily.  potassium chloride SR (K-TAB) 20 mEq tablet Take 1 Tab by mouth two (2) times a day. 14 Tab 0    lactulose (CHRONULAC) 10 gram/15 mL solution Take 30 g by mouth three (3) times daily.  omeprazole (PRILOSEC) 20 mg capsule Take 20 mg by mouth daily.  ondansetron hcl (ZOFRAN) 4 mg tablet Take 4 mg by mouth every six (6) hours as needed for Nausea.  rifAXIMin (XIFAXAN) 550 mg tablet Take 550 mg by mouth two (2) times a day.          Patient Active Problem List    Diagnosis Date Noted    Biliary cirrhosis ([facility] 09/11/2018    Cirrhosis of liver with ascites ([facility] 09/11/2018    Chronic pain syndrome 09/11/2018    Hepatic encephalopathy ([facility] 01/09/2018    Chronic abdominal pain 01/09/2018     Results for orders placed or performed during the hospital encounter of 07/16/18   LACTIC ACID   Result Value Ref Range    Lactic Acid 2.4 (H) 0.4 - 2.0 mmol/L     Admission on 07/16/2018, Discharged on 07/16/2018   Component Date Value Ref Range Status    Lactic Acid 07/16/2018 2.4* 0.4 - 2.0 mmol/L Final          Follow-up Disposition: Not on File

## 2018-10-16 LAB
AMPHETAMINES UR QL SCN: NEGATIVE NG/ML
BARBITURATES UR QL SCN: NEGATIVE NG/ML
BENZODIAZ UR QL SCN: NEGATIVE NG/ML
BUPRENORPHINE UR QL: NEGATIVE NG/ML
BZE UR QL SCN: NEGATIVE NG/ML
CANNABINOIDS UR QL SCN: NEGATIVE NG/ML
CREAT UR-MCNC: 168 MG/DL (ref 20–300)
METHADONE UR QL SCN: NEGATIVE NG/ML
OPIATES UR QL SCN: POSITIVE NG/ML
OXYCODONE+OXYMORPHONE UR QL SCN: NEGATIVE NG/ML
PCP UR QL: NEGATIVE NG/ML
PH UR: 5.8 [PH] (ref 4.5–8.9)
PLEASE NOTE:, 733163: ABNORMAL
PROPOXYPH UR QL SCN: NEGATIVE NG/ML

## 2018-10-17 RX ORDER — HYDROCODONE BITARTRATE AND ACETAMINOPHEN 10; 325 MG/1; MG/1
0.5 TABLET ORAL 2 TIMES DAILY
Qty: 20 TAB | Refills: 0 | Status: SHIPPED | OUTPATIENT
Start: 2018-10-17 | End: 2018-11-01 | Stop reason: SDUPTHER

## 2018-11-01 ENCOUNTER — OFFICE VISIT (OUTPATIENT)
Dept: FAMILY MEDICINE CLINIC | Age: 58
End: 2018-11-01

## 2018-11-01 VITALS
SYSTOLIC BLOOD PRESSURE: 140 MMHG | WEIGHT: 145 LBS | HEART RATE: 105 BPM | HEIGHT: 60 IN | BODY MASS INDEX: 28.47 KG/M2 | RESPIRATION RATE: 18 BRPM | OXYGEN SATURATION: 99 % | TEMPERATURE: 98.2 F | DIASTOLIC BLOOD PRESSURE: 71 MMHG

## 2018-11-01 DIAGNOSIS — G89.29 CHRONIC ABDOMINAL PAIN: ICD-10-CM

## 2018-11-01 DIAGNOSIS — K74.5 BILIARY CIRRHOSIS (HCC): Primary | ICD-10-CM

## 2018-11-01 DIAGNOSIS — G89.4 CHRONIC PAIN SYNDROME: ICD-10-CM

## 2018-11-01 DIAGNOSIS — R10.9 CHRONIC ABDOMINAL PAIN: ICD-10-CM

## 2018-11-01 DIAGNOSIS — R18.8 CIRRHOSIS OF LIVER WITH ASCITES, UNSPECIFIED HEPATIC CIRRHOSIS TYPE (HCC): ICD-10-CM

## 2018-11-01 DIAGNOSIS — K74.60 CIRRHOSIS OF LIVER WITH ASCITES, UNSPECIFIED HEPATIC CIRRHOSIS TYPE (HCC): ICD-10-CM

## 2018-11-01 RX ORDER — HYDROCODONE BITARTRATE AND ACETAMINOPHEN 10; 325 MG/1; MG/1
0.5 TABLET ORAL 2 TIMES DAILY
Qty: 20 TAB | Refills: 0 | Status: SHIPPED | OUTPATIENT
Start: 2018-11-01 | End: 2018-11-21

## 2018-11-01 NOTE — PROGRESS NOTES
Gideon Riedel is a 66 y.o. female (: 1960) presenting to address: Chief Complaint Patient presents with  Medication Refill Vitals:  
 18 1127 BP: 140/71 Pulse: (!) 105 Resp: 18 Temp: 98.2 °F (36.8 °C) SpO2: 99% Weight: 145 lb (65.8 kg) Height: 5' (1.524 m) PainSc:   7 Hearing/Vision: No exam data present Learning Assessment:  
 
Learning Assessment 2018 PRIMARY LEARNER Patient PRIMARY LANGUAGE ENGLISH  
LEARNER PREFERENCE PRIMARY PICTURES  
  LISTENING  
  DEMONSTRATION  
  VIDEOS  
ANSWERED BY patient RELATIONSHIP SELF Depression Screening: PHQ over the last two weeks 2018 Little interest or pleasure in doing things Not at all Feeling down, depressed, irritable, or hopeless Not at all Total Score PHQ 2 0 Fall Risk Assessment:  
 
Fall Risk Assessment, last 12 mths 2018 Able to walk? Yes Fall in past 12 months? No  
 
Abuse Screening:  
 
Abuse Screening Questionnaire 2018 Do you ever feel afraid of your partner? Roxanna Covington Are you in a relationship with someone who physically or mentally threatens you? Roxanna Covington Is it safe for you to go home? Gladis Giron Coordination of Care Questionaire: 1. Have you been to the ER, urgent care clinic since your last visit? Hospitalized since your last visit? Elizabeth Sosa 2. Have you seen or consulted any other health care providers outside of the [address] [place] [address] since your last visit? Include any pap smears or colon screening.  NO

## 2018-11-01 NOTE — PROGRESS NOTES
[facility] Chief Complaint Patient presents with  Medication Refill Vitals:  
 18 1127 BP: 140/71 Pulse: (!) 105 Resp: 18 Temp: 98.2 °F (36.8 °C) SpO2: 99% Weight: 145 lb (65.8 kg) Height: 5' (1.524 m) PainSc:   7  
 
 
 
HPI: Patient is here for medication refill for pain, pain management on 2018, she has appointment was [facility] [place]. Patient still having right upper quadrant pain which is chronic, due to her liver cirrhosis. She is having nausea and occasional vomiting. No fever no chills.  was checked and last time she was prescribed on  from the emergency room department 20 tablets of Norco. 
 
I have discussed with the patient that we will be the last time she received pain medication prescription from here and she will be following with the pain management from now on. She can come for her other complaints. Past Medical History:  
Diagnosis Date  Arthropathy, unspecified, site unspecified  Cirrhosis ([address]  Esophageal reflux  Hemochromatosis  Hypertension  Liver failure ([address]  Lower back pain  Migraine  Pancreatitis  Sinusitis  Uterine cancer ([address] Past Surgical History:  
Procedure Laterality Date  HX HERNIA REPAIR    
 HX LAP CHOLECYSTECTOMY    
 3/2007  HX TOTAL VAGINAL HYSTERECTOMY Social History Tobacco Use  Smoking status: Former Smoker Packs/day: 0.00 Last attempt to quit: 2017 Years since quittin.9  Smokeless tobacco: Never Used Substance Use Topics  Alcohol use: No  
 
 
Family History Problem Relation Age of Onset  Hypertension Father Review of Systems Constitutional: Negative for chills, fever, malaise/fatigue and weight loss. HENT: Negative for congestion, ear discharge, ear pain, hearing loss and nosebleeds. Eyes: Negative for blurred vision, double vision and discharge. Respiratory: Negative for cough. Cardiovascular: Positive for leg swelling. Negative for chest pain, palpitations and claudication. Gastrointestinal: Positive for abdominal pain, nausea and vomiting. Negative for blood in stool, constipation and diarrhea. Genitourinary: Negative for dysuria, frequency and urgency. Musculoskeletal: Negative for myalgias. Skin: Negative for itching and rash. Neurological: Negative for dizziness, tingling, sensory change, speech change, focal weakness, weakness and headaches. Psychiatric/Behavioral: Negative for depression and suicidal ideas. Physical Exam  
Constitutional: She is oriented to person, place, and time. She appears well-developed and well-nourished. No distress. HENT:  
Head: Normocephalic and atraumatic. Mouth/Throat: Oropharynx is clear and moist. No oropharyngeal exudate. Eyes: Conjunctivae are normal. Pupils are equal, round, and reactive to light. Right eye exhibits no discharge. Left eye exhibits no discharge. No scleral icterus. Neck: Normal range of motion. Neck supple. Cardiovascular: Normal rate, regular rhythm and normal heart sounds. No murmur heard. Pulmonary/Chest: Effort normal and breath sounds normal. No respiratory distress. She has no wheezes. She has no rales. She exhibits no tenderness. Abdominal: Soft. Bowel sounds are normal. She exhibits no distension. There is tenderness. There is no rebound and no guarding. Distended abdomen due to ascites Musculoskeletal: Normal range of motion. She exhibits tenderness. She exhibits no edema or deformity. Neurological: She is alert and oriented to person, place, and time. No cranial nerve deficit. Coordination normal.  
Skin: Skin is warm and dry. Rash noted. She is not diaphoretic. There is erythema. No pallor. Psychiatric: She has a normal mood and affect. Her behavior is normal. Judgment and thought content normal.  
  
 
Assessment and plan 1. Biliary cirrhosis ([facility] 2. Cirrhosis of liver with ascites, unspecified hepatic cirrhosis type ([facility] 3. Chronic abdominal pain 
 
- HYDROcodone-acetaminophen (NORCO)  mg tablet; Take 0.5 Tabs by mouth two (2) times a day for 20 days. Max Daily Amount: 1 Tab. Dispense: 20 Tab; Refill: 0 
 
4. Chronic pain syndrome 
 
- HYDROcodone-acetaminophen (NORCO)  mg tablet; Take 0.5 Tabs by mouth two (2) times a day for 20 days. Max Daily Amount: 1 Tab. Dispense: 20 Tab; Refill: 0 Current Outpatient Medications Medication Sig Dispense Refill  
 HYDROcodone-acetaminophen (NORCO)  mg tablet Take 0.5 Tabs by mouth two (2) times a day for 20 days. Max Daily Amount: 1 Tab. 20 Tab 0  
 naloxone (NARCAN) 4 mg/actuation nasal spray Use 1 spray intranasally, then discard. Repeat with new spray every 2 min as needed for opioid overdose symptoms, alternating nostrils. 1 Each 0  
 promethazine (PHENERGAN) 25 mg tablet Take 1 Tab by mouth every six (6) hours as needed. 30 Tab 2  
 furosemide (LASIX) 20 mg tablet Take  by mouth daily.  spironolactone (ALDACTONE) 50 mg tablet Take  by mouth daily.  potassium chloride SR (K-TAB) 20 mEq tablet Take 1 Tab by mouth two (2) times a day. 14 Tab 0  
 lactulose (CHRONULAC) 10 gram/15 mL solution Take 30 g by mouth three (3) times daily.  omeprazole (PRILOSEC) 20 mg capsule Take 20 mg by mouth daily.  ondansetron hcl (ZOFRAN) 4 mg tablet Take 4 mg by mouth every six (6) hours as needed for Nausea.  rifAXIMin (XIFAXAN) 550 mg tablet Take 550 mg by mouth two (2) times a day. Patient Active Problem List  
 Diagnosis Date Noted  Biliary cirrhosis ([facility] 09/11/2018  Cirrhosis of liver with ascites ([facility] 09/11/2018  Chronic pain syndrome 09/11/2018  Hepatic encephalopathy ([facility] 01/09/2018  Chronic abdominal pain 01/09/2018 Results for orders placed or performed in visit on 10/15/18  
11-DRUG SCREEN, URINE  
 Result Value Ref Range Amphetamine Screen, urine Negative Lwzdlc=6858 ng/mL Barbiturates Screen, urine Negative Gwbkho=622 ng/mL Benzodiazepines Screen, urine Negative Lclirg=822 ng/mL Cannabinoid Screen, urine Negative Cutoff=20 ng/mL Cocaine Metab. Screen, urine Negative Wsucfh=212 ng/mL Opiate Screen, urine Positive (A) Cznvyl=688 ng/mL Oxycodone/Oxymorphone, urine Negative Vbhibb=083 ng/mL Phencyclidine Screen, urine Negative Cutoff=25 ng/mL Methadone Screen, urine Negative Hvpdia=294 ng/mL Propoxyphene Screen, urine Negative Xxxzrx=546 ng/mL Buprenorphine, urine Negative Cutoff=10 ng/mL Creatinine, urine 168.0 20.0 - 300.0 mg/dL  
 pH, urine 5.8 4.5 - 8.9 Please note: Comment Office Visit on 10/15/2018 Component Date Value Ref Range Status  Amphetamine Screen, urine 10/15/2018 Negative  Wsladi=3397 ng/mL Final  
 Barbiturates Screen, urine 10/15/2018 Negative  Pjijcu=735 ng/mL Final  
 Benzodiazepines Screen, urine 10/15/2018 Negative  Axqbxm=415 ng/mL Final  
 Cannabinoid Screen, urine 10/15/2018 Negative  Cutoff=20 ng/mL Final  
 Cocaine Metab. Screen, urine 10/15/2018 Negative  Bachli=737 ng/mL Final  
 Opiate Screen, urine 10/15/2018 Positive* Hpcnlz=378 ng/mL Final  
 Opiate test includes Codeine, Morphine, Hydromorphone, Hydrocodone.  Oxycodone/Oxymorphone, urine 10/15/2018 Negative  Lchnfl=819 ng/mL Final  
 Test includes Oxycodone and Oxymorphone  Phencyclidine Screen, urine 10/15/2018 Negative  Cutoff=25 ng/mL Final  
 Methadone Screen, urine 10/15/2018 Negative  Smcvlh=817 ng/mL Final  
 Propoxyphene Screen, urine 10/15/2018 Negative  Embwbn=883 ng/mL Final  
 Buprenorphine, urine 10/15/2018 Negative  Cutoff=10 ng/mL Final  
 Creatinine, urine 10/15/2018 168.0  20.0 - 300.0 mg/dL Final  
 pH, urine 10/15/2018 5.8  4.5 - 8.9 Final  
 Please note: 10/15/2018 Comment   Final  
 Comment: This assay provides a preliminary unconfirmed analytical test 
result that may be suitable for clinical management of patients 
in certain situations. Drug-test results should be interpreted 
in the context of clinical information. Patient metabolic 
variables, specific drug chemistry, and specimen 
characteristics can affect test outcome. Technical consultation 
is available if a test result is inconsistent with an expected 
outcome. ([email or web address] or call toll-free [phone]) Follow-up Disposition: 
Return if symptoms worsen or fail to improve.

## 2018-11-03 DIAGNOSIS — R11.2 NON-INTRACTABLE VOMITING WITH NAUSEA, UNSPECIFIED VOMITING TYPE: ICD-10-CM

## 2018-11-03 NOTE — TELEPHONE ENCOUNTER
Medication refill via fax from [facility], [address] [place], [place]   For :      promethazine (PHENERGAN) 25 mg tablet 30 Tab 2     Last refill date 10/30/2018 (2/2)    Please review if necessary for another 30 day refill (not eligible for fulfillment until 12/1/18)

## 2018-11-06 RX ORDER — PROMETHAZINE HYDROCHLORIDE 25 MG/1
25 TABLET ORAL
Qty: 30 TAB | Refills: 0 | Status: ON HOLD | OUTPATIENT
Start: 2018-12-01 | End: 2019-04-08 | Stop reason: SDUPTHER

## 2019-01-15 ENCOUNTER — TELEPHONE (OUTPATIENT)
Dept: FAMILY MEDICINE CLINIC | Age: 59
End: 2019-01-15

## 2019-01-15 NOTE — TELEPHONE ENCOUNTER
After hours contact with patient's daughter reporting patient's leg right swelling to twice normal size during the afternoon, now \"numb\" - ED evaluation recommended

## 2019-03-13 NOTE — TELEPHONE ENCOUNTER
Pt calling to request medication refill of:  Requested Prescriptions     Pending Prescriptions Disp Refills    furosemide (LASIX) 20 mg tablet       Sig: Take  by mouth daily. be sent to [facility], [address] [facility], [address] [place] [address]  Pt has about 0 tabs remaining. Pts last appt was 11/1, next appt sched for 3/19. Advised pt of 48-72 hour time frame for refill requests. Please advise.

## 2019-03-22 RX ORDER — FUROSEMIDE 20 MG/1
20 TABLET ORAL DAILY
OUTPATIENT
Start: 2019-03-22

## 2019-03-22 NOTE — TELEPHONE ENCOUNTER
Patient was not seen here recently no blood work done sugar but her random kidney function and where she is following up currently

## 2019-04-07 PROBLEM — K74.3 PRIMARY BILIARY CIRRHOSIS (HCC): Status: ACTIVE | Noted: 2019-04-07

## 2019-04-07 PROBLEM — E87.20 LACTIC ACIDOSIS: Status: ACTIVE | Noted: 2019-04-07

## 2019-04-07 PROBLEM — R11.0 NAUSEA: Status: ACTIVE | Noted: 2019-04-07

## 2019-04-08 PROBLEM — R11.0 NAUSEA: Status: RESOLVED | Noted: 2019-04-07 | Resolved: 2019-04-08

## 2019-04-09 NOTE — TELEPHONE ENCOUNTER
Patient was admitted to the hospital yesterday. Patient has not been seen since 11/01/2018. LVM for patient to call back to the office to find out if she intends to continue being seen her. Patient needs f/u.

## 2019-08-21 ENCOUNTER — DOCUMENTATION ONLY (OUTPATIENT)
Dept: FAMILY MEDICINE CLINIC | Age: 59
End: 2019-08-21

## 2019-08-21 NOTE — PROGRESS NOTES
Our office was contacted on 8/20/19  by Adolph López from [facility] ([id number]) to schedule a hospital follow-up visit for patient. At that time Adolph López was told the office had no openings until September. Adolph López made the appointment but requested to speak with the manager in regards to an earlier appointment. The following dates and times are my attempts to reach Adolph López and the patient to schedule an appointment for hospital follow-up this week. 8/21/19 8am LVM on patients cell, 9:08am called patients home number no answer unable to leave voice message, 11:43 LVM on cell.  8/21/19- Called Laila at [phone] at 8:00am, 9:08am and 12:10pm. No answer unable to leave voice message.

## 2019-10-30 PROBLEM — R51.9 HEADACHE: Status: ACTIVE | Noted: 2019-10-30

## 2019-10-30 PROBLEM — D63.8 ANEMIA OF CHRONIC DISEASE: Status: ACTIVE | Noted: 2019-08-19

## 2019-10-30 PROBLEM — Z51.5 PALLIATIVE CARE BY SPECIALIST: Status: ACTIVE | Noted: 2019-10-30

## 2019-10-30 PROBLEM — Z85.42 HISTORY OF UTERINE CANCER: Status: ACTIVE | Noted: 2019-02-10

## 2019-10-30 PROBLEM — R11.10 VOMITING: Status: ACTIVE | Noted: 2019-03-29

## 2019-10-30 PROBLEM — E87.20 LACTIC ACIDOSIS: Status: ACTIVE | Noted: 2019-05-31

## 2019-10-30 PROBLEM — R63.0 DECREASED APPETITE: Status: ACTIVE | Noted: 2019-10-30

## 2019-10-30 PROBLEM — F41.9 ANXIETY: Status: ACTIVE | Noted: 2019-10-30

## 2019-10-30 PROBLEM — E80.6 HYPERBILIRUBINEMIA: Status: ACTIVE | Noted: 2019-10-05

## 2019-10-30 PROBLEM — R11.2 INTRACTABLE VOMITING WITH NAUSEA: Status: ACTIVE | Noted: 2019-02-23

## 2019-10-30 PROBLEM — R92.8 ABNORMAL MAMMOGRAM: Status: ACTIVE | Noted: 2018-08-28

## 2019-10-30 PROBLEM — K74.3 PRIMARY BILIARY CHOLANGITIS (HCC): Status: ACTIVE | Noted: 2018-12-25

## 2019-10-30 PROBLEM — R04.0 EPISTAXIS: Status: ACTIVE | Noted: 2019-09-15

## 2019-10-30 PROBLEM — L03.90 CELLULITIS: Status: ACTIVE | Noted: 2019-10-30

## 2019-10-30 PROBLEM — D61.818 PANCYTOPENIA (HCC): Status: ACTIVE | Noted: 2018-12-25

## 2019-10-30 PROBLEM — G89.4 CHRONIC PAIN DISORDER: Status: ACTIVE | Noted: 2017-02-05

## 2019-10-30 PROBLEM — K72.10 CHRONIC LIVER FAILURE WITHOUT HEPATIC COMA (HCC): Status: ACTIVE | Noted: 2019-10-24

## 2019-10-30 PROBLEM — R17 JAUNDICE: Status: ACTIVE | Noted: 2019-10-30

## 2019-10-30 PROBLEM — J20.9 ACUTE BRONCHITIS: Status: ACTIVE | Noted: 2019-10-30

## 2019-10-30 PROBLEM — R14.0 ABDOMINAL DISTENTION: Status: ACTIVE | Noted: 2019-08-02

## 2019-10-30 PROBLEM — M54.12 BRACHIAL NEURITIS: Status: ACTIVE | Noted: 2019-10-30

## 2019-10-30 PROBLEM — E87.70 FLUID OVERLOAD: Status: ACTIVE | Noted: 2019-08-10

## 2019-10-30 PROBLEM — K76.6 PORTAL HYPERTENSION (HCC): Status: ACTIVE | Noted: 2019-03-07

## 2019-10-30 PROBLEM — B35.3 TINEA PEDIS: Status: ACTIVE | Noted: 2019-10-30

## 2019-10-30 PROBLEM — E88.01 ALPHA-1-ANTITRYPSIN DEFICIENCY (HCC): Status: ACTIVE | Noted: 2019-05-31

## 2019-10-30 PROBLEM — K83.8 DILATED CBD, ACQUIRED: Status: ACTIVE | Noted: 2019-10-05

## 2019-10-30 PROBLEM — K92.2 UPPER GI BLEED: Status: ACTIVE | Noted: 2018-11-27

## 2019-10-30 PROBLEM — E87.1 HYPONATREMIA: Status: ACTIVE | Noted: 2019-05-31

## 2019-10-30 PROBLEM — R09.02 HYPOXIA: Status: ACTIVE | Noted: 2019-05-20

## 2019-10-30 PROBLEM — G43.909 MIGRAINE: Status: ACTIVE | Noted: 2019-10-30

## 2019-10-30 PROBLEM — D69.6 THROMBOCYTOPENIA (HCC): Status: ACTIVE | Noted: 2017-02-05

## 2019-10-30 PROBLEM — Z87.19 HISTORY OF GI BLEED: Status: ACTIVE | Noted: 2019-01-05

## 2019-10-30 PROBLEM — K92.2 GASTROINTESTINAL HEMORRHAGE: Status: ACTIVE | Noted: 2019-06-15

## 2019-10-30 PROBLEM — R93.3 ABNORMAL COLONOSCOPY: Status: ACTIVE | Noted: 2018-08-28

## 2019-10-30 PROBLEM — D64.9 CHRONIC ANEMIA: Status: ACTIVE | Noted: 2018-11-27

## 2019-10-30 PROBLEM — R52 PAIN: Status: ACTIVE | Noted: 2019-10-30

## 2019-10-30 PROBLEM — K72.90 DECOMPENSATED HEPATIC CIRRHOSIS (HCC): Status: ACTIVE | Noted: 2018-08-28

## 2019-10-30 PROBLEM — K74.60 DECOMPENSATED HEPATIC CIRRHOSIS (HCC): Status: ACTIVE | Noted: 2018-08-28

## 2019-10-30 PROBLEM — J45.991 COUGH VARIANT ASTHMA: Status: ACTIVE | Noted: 2019-10-30

## 2019-10-30 PROBLEM — H10.30 ACUTE CONJUNCTIVITIS: Status: ACTIVE | Noted: 2019-10-30

## 2019-10-30 PROBLEM — R63.5 WEIGHT GAIN: Status: ACTIVE | Noted: 2019-08-20

## 2019-10-30 PROBLEM — F11.10 OPIATE ABUSE, CONTINUOUS (HCC): Status: ACTIVE | Noted: 2018-08-28

## 2019-10-30 PROBLEM — Z87.19 HISTORY OF CIRRHOSIS OF LIVER: Status: ACTIVE | Noted: 2019-08-10

## 2019-10-30 PROBLEM — R10.84 GENERALIZED ABDOMINAL PAIN: Status: ACTIVE | Noted: 2019-08-18

## 2019-10-30 PROBLEM — R60.0 PEDAL EDEMA: Status: ACTIVE | Noted: 2019-08-18

## 2019-10-30 PROBLEM — G89.29 CHRONIC PAIN: Status: ACTIVE | Noted: 2019-08-10

## 2019-10-30 PROBLEM — R53.1 WEAKNESS: Status: ACTIVE | Noted: 2019-10-30

## 2019-10-30 PROBLEM — E11.9 TYPE 2 DIABETES MELLITUS WITHOUT COMPLICATION (HCC): Status: ACTIVE | Noted: 2019-10-30

## 2019-10-30 PROBLEM — M54.50 LOW BACK PAIN: Status: ACTIVE | Noted: 2019-10-30

## 2019-10-30 PROBLEM — R60.1 ANASARCA: Status: ACTIVE | Noted: 2019-05-31

## 2019-10-30 PROBLEM — R18.8 ASCITES: Status: ACTIVE | Noted: 2019-08-18

## 2019-10-30 PROBLEM — K74.60 CIRRHOSIS OF LIVER (HCC): Status: ACTIVE | Noted: 2017-08-17

## 2021-08-03 PROBLEM — K92.2 GASTROINTESTINAL HEMORRHAGE: Status: RESOLVED | Noted: 2019-06-15 | Resolved: 2021-08-03
